# Patient Record
Sex: FEMALE | Race: BLACK OR AFRICAN AMERICAN | NOT HISPANIC OR LATINO | Employment: FULL TIME | URBAN - METROPOLITAN AREA
[De-identification: names, ages, dates, MRNs, and addresses within clinical notes are randomized per-mention and may not be internally consistent; named-entity substitution may affect disease eponyms.]

---

## 2018-11-16 ENCOUNTER — HOSPITAL ENCOUNTER (INPATIENT)
Facility: CLINIC | Age: 38
Setting detail: SURGERY ADMIT
End: 2018-11-16
Attending: SURGERY | Admitting: SURGERY
Payer: COMMERCIAL

## 2018-11-16 ENCOUNTER — HOSPITAL ENCOUNTER (OUTPATIENT)
Facility: CLINIC | Age: 38
Setting detail: OBSERVATION
Discharge: HOME | End: 2018-11-18
Attending: EMERGENCY MEDICINE | Admitting: SURGERY
Payer: COMMERCIAL

## 2018-11-16 ENCOUNTER — APPOINTMENT (OUTPATIENT)
Dept: ULTRASOUND IMAGING | Facility: CLINIC | Age: 38
End: 2018-11-16
Attending: EMERGENCY MEDICINE
Payer: COMMERCIAL

## 2018-11-16 DIAGNOSIS — K82.1 GALLBLADDER HYDROPS: Primary | ICD-10-CM

## 2018-11-16 DIAGNOSIS — K81.0 ACUTE CHOLECYSTITIS: ICD-10-CM

## 2018-11-16 LAB
ALBUMIN SERPL-MCNC: 3.7 G/DL (ref 3.4–5)
ALBUMIN UR-MCNC: NEGATIVE MG/DL
ALP SERPL-CCNC: 29 U/L (ref 40–150)
ALT SERPL W P-5'-P-CCNC: 27 U/L (ref 0–50)
ANION GAP SERPL CALCULATED.3IONS-SCNC: 9 MMOL/L (ref 3–14)
APPEARANCE UR: CLEAR
AST SERPL W P-5'-P-CCNC: 36 U/L (ref 0–45)
BASOPHILS # BLD AUTO: 0 10E9/L (ref 0–0.2)
BASOPHILS NFR BLD AUTO: 0.2 %
BILIRUB SERPL-MCNC: 0.5 MG/DL (ref 0.2–1.3)
BILIRUB UR QL STRIP: NEGATIVE
BUN SERPL-MCNC: 5 MG/DL (ref 7–30)
CALCIUM SERPL-MCNC: 9 MG/DL (ref 8.5–10.1)
CHLORIDE SERPL-SCNC: 105 MMOL/L (ref 94–109)
CO2 SERPL-SCNC: 23 MMOL/L (ref 20–32)
COLOR UR AUTO: ABNORMAL
CREAT SERPL-MCNC: 0.51 MG/DL (ref 0.52–1.04)
DIFFERENTIAL METHOD BLD: NORMAL
EOSINOPHIL # BLD AUTO: 0.2 10E9/L (ref 0–0.7)
EOSINOPHIL NFR BLD AUTO: 2.4 %
ERYTHROCYTE [DISTWIDTH] IN BLOOD BY AUTOMATED COUNT: 13.3 % (ref 10–15)
GFR SERPL CREATININE-BSD FRML MDRD: >90 ML/MIN/1.7M2
GLUCOSE SERPL-MCNC: 72 MG/DL (ref 70–99)
GLUCOSE UR STRIP-MCNC: NEGATIVE MG/DL
HCG UR QL: NEGATIVE
HCT VFR BLD AUTO: 42.5 % (ref 35–47)
HGB BLD-MCNC: 13.9 G/DL (ref 11.7–15.7)
HGB UR QL STRIP: NEGATIVE
IMM GRANULOCYTES # BLD: 0 10E9/L (ref 0–0.4)
IMM GRANULOCYTES NFR BLD: 0.2 %
KETONES UR STRIP-MCNC: 10 MG/DL
LACTATE BLD-SCNC: 1.4 MMOL/L (ref 0.7–2)
LEUKOCYTE ESTERASE UR QL STRIP: NEGATIVE
LIPASE SERPL-CCNC: 74 U/L (ref 73–393)
LYMPHOCYTES # BLD AUTO: 2.2 10E9/L (ref 0.8–5.3)
LYMPHOCYTES NFR BLD AUTO: 35.6 %
MCH RBC QN AUTO: 32 PG (ref 26.5–33)
MCHC RBC AUTO-ENTMCNC: 32.7 G/DL (ref 31.5–36.5)
MCV RBC AUTO: 98 FL (ref 78–100)
MONOCYTES # BLD AUTO: 0.5 10E9/L (ref 0–1.3)
MONOCYTES NFR BLD AUTO: 7.3 %
MUCOUS THREADS #/AREA URNS LPF: PRESENT /LPF
NEUTROPHILS # BLD AUTO: 3.4 10E9/L (ref 1.6–8.3)
NEUTROPHILS NFR BLD AUTO: 54.3 %
NITRATE UR QL: NEGATIVE
NRBC # BLD AUTO: 0 10*3/UL
NRBC BLD AUTO-RTO: 0 /100
PH UR STRIP: 5 PH (ref 5–7)
PLATELET # BLD AUTO: 258 10E9/L (ref 150–450)
POTASSIUM SERPL-SCNC: 4.4 MMOL/L (ref 3.4–5.3)
PROT SERPL-MCNC: 8.9 G/DL (ref 6.8–8.8)
RADIOLOGIST FLAGS: ABNORMAL
RBC # BLD AUTO: 4.35 10E12/L (ref 3.8–5.2)
RBC #/AREA URNS AUTO: 68 /HPF (ref 0–2)
SODIUM SERPL-SCNC: 136 MMOL/L (ref 133–144)
SOURCE: ABNORMAL
SP GR UR STRIP: 1.01 (ref 1–1.03)
SQUAMOUS #/AREA URNS AUTO: <1 /HPF (ref 0–1)
UROBILINOGEN UR STRIP-MCNC: NORMAL MG/DL (ref 0–2)
WBC # BLD AUTO: 6.2 10E9/L (ref 4–11)
WBC #/AREA URNS AUTO: 1 /HPF (ref 0–5)

## 2018-11-16 PROCEDURE — 25000128 H RX IP 250 OP 636: Performed by: EMERGENCY MEDICINE

## 2018-11-16 PROCEDURE — 96375 TX/PRO/DX INJ NEW DRUG ADDON: CPT | Performed by: EMERGENCY MEDICINE

## 2018-11-16 PROCEDURE — 12000001 ZZH R&B MED SURG/OB UMMC

## 2018-11-16 PROCEDURE — 80053 COMPREHEN METABOLIC PANEL: CPT | Performed by: EMERGENCY MEDICINE

## 2018-11-16 PROCEDURE — 76705 ECHO EXAM OF ABDOMEN: CPT

## 2018-11-16 PROCEDURE — 83690 ASSAY OF LIPASE: CPT | Performed by: EMERGENCY MEDICINE

## 2018-11-16 PROCEDURE — 96374 THER/PROPH/DIAG INJ IV PUSH: CPT | Performed by: EMERGENCY MEDICINE

## 2018-11-16 PROCEDURE — 81001 URINALYSIS AUTO W/SCOPE: CPT | Performed by: EMERGENCY MEDICINE

## 2018-11-16 PROCEDURE — 25000128 H RX IP 250 OP 636: Performed by: STUDENT IN AN ORGANIZED HEALTH CARE EDUCATION/TRAINING PROGRAM

## 2018-11-16 PROCEDURE — 25000132 ZZH RX MED GY IP 250 OP 250 PS 637: Performed by: EMERGENCY MEDICINE

## 2018-11-16 PROCEDURE — 83605 ASSAY OF LACTIC ACID: CPT | Performed by: EMERGENCY MEDICINE

## 2018-11-16 PROCEDURE — 85025 COMPLETE CBC W/AUTO DIFF WBC: CPT | Performed by: EMERGENCY MEDICINE

## 2018-11-16 PROCEDURE — 99285 EMERGENCY DEPT VISIT HI MDM: CPT | Mod: 25 | Performed by: EMERGENCY MEDICINE

## 2018-11-16 PROCEDURE — 81025 URINE PREGNANCY TEST: CPT | Performed by: EMERGENCY MEDICINE

## 2018-11-16 PROCEDURE — 99285 EMERGENCY DEPT VISIT HI MDM: CPT | Mod: Z6 | Performed by: EMERGENCY MEDICINE

## 2018-11-16 PROCEDURE — 25000125 ZZHC RX 250: Performed by: EMERGENCY MEDICINE

## 2018-11-16 RX ORDER — ACETAMINOPHEN 500 MG
500-1000 TABLET ORAL EVERY 6 HOURS PRN
COMMUNITY
End: 2020-09-23

## 2018-11-16 RX ORDER — MULTIVIT-MIN/IRON/FOLIC ACID/K 18-600-40
1000 CAPSULE ORAL DAILY
COMMUNITY

## 2018-11-16 RX ORDER — RIBOFLAVIN (VITAMIN B2) 100 MG
100 TABLET ORAL DAILY
COMMUNITY

## 2018-11-16 RX ORDER — CEFTRIAXONE 1 G/1
1 INJECTION, POWDER, FOR SOLUTION INTRAMUSCULAR; INTRAVENOUS EVERY 24 HOURS
Status: DISCONTINUED | OUTPATIENT
Start: 2018-11-16 | End: 2018-11-17

## 2018-11-16 RX ORDER — OXYCODONE HYDROCHLORIDE 5 MG/1
5-10 TABLET ORAL
Status: DISCONTINUED | OUTPATIENT
Start: 2018-11-16 | End: 2018-11-17

## 2018-11-16 RX ORDER — PROCHLORPERAZINE MALEATE 5 MG
10 TABLET ORAL EVERY 6 HOURS PRN
Status: DISCONTINUED | OUTPATIENT
Start: 2018-11-16 | End: 2018-11-17

## 2018-11-16 RX ORDER — HYDROMORPHONE HCL/0.9% NACL/PF 0.2MG/0.2
0.2 SYRINGE (ML) INTRAVENOUS
Status: DISCONTINUED | OUTPATIENT
Start: 2018-11-16 | End: 2018-11-17

## 2018-11-16 RX ORDER — NALOXONE HYDROCHLORIDE 0.4 MG/ML
.1-.4 INJECTION, SOLUTION INTRAMUSCULAR; INTRAVENOUS; SUBCUTANEOUS
Status: DISCONTINUED | OUTPATIENT
Start: 2018-11-16 | End: 2018-11-17

## 2018-11-16 RX ORDER — HYDROMORPHONE HYDROCHLORIDE 1 MG/ML
0.5 INJECTION, SOLUTION INTRAMUSCULAR; INTRAVENOUS; SUBCUTANEOUS ONCE
Status: COMPLETED | OUTPATIENT
Start: 2018-11-16 | End: 2018-11-16

## 2018-11-16 RX ORDER — PROCHLORPERAZINE 25 MG
25 SUPPOSITORY, RECTAL RECTAL EVERY 12 HOURS PRN
Status: DISCONTINUED | OUTPATIENT
Start: 2018-11-16 | End: 2018-11-17

## 2018-11-16 RX ORDER — SODIUM CHLORIDE, SODIUM LACTATE, POTASSIUM CHLORIDE, CALCIUM CHLORIDE 600; 310; 30; 20 MG/100ML; MG/100ML; MG/100ML; MG/100ML
1000 INJECTION, SOLUTION INTRAVENOUS CONTINUOUS
Status: DISCONTINUED | OUTPATIENT
Start: 2018-11-16 | End: 2018-11-17

## 2018-11-16 RX ORDER — ONDANSETRON 2 MG/ML
4 INJECTION INTRAMUSCULAR; INTRAVENOUS EVERY 6 HOURS PRN
Status: DISCONTINUED | OUTPATIENT
Start: 2018-11-16 | End: 2018-11-17

## 2018-11-16 RX ORDER — LIDOCAINE 40 MG/G
CREAM TOPICAL
Status: DISCONTINUED | OUTPATIENT
Start: 2018-11-16 | End: 2018-11-17

## 2018-11-16 RX ORDER — ONDANSETRON 4 MG/1
4 TABLET, ORALLY DISINTEGRATING ORAL EVERY 6 HOURS PRN
Status: DISCONTINUED | OUTPATIENT
Start: 2018-11-16 | End: 2018-11-17

## 2018-11-16 RX ORDER — ACETAMINOPHEN 325 MG/1
975 TABLET ORAL EVERY 8 HOURS
Status: DISCONTINUED | OUTPATIENT
Start: 2018-11-17 | End: 2018-11-17

## 2018-11-16 RX ADMIN — CEFTRIAXONE 1 G: 1 INJECTION, POWDER, FOR SOLUTION INTRAMUSCULAR; INTRAVENOUS at 18:49

## 2018-11-16 RX ADMIN — ONDANSETRON HYDROCHLORIDE 4 MG: 2 INJECTION, SOLUTION INTRAMUSCULAR; INTRAVENOUS at 18:59

## 2018-11-16 RX ADMIN — LIDOCAINE HYDROCHLORIDE 30 ML: 20 SOLUTION ORAL; TOPICAL at 14:27

## 2018-11-16 RX ADMIN — Medication 0.5 MG: at 18:10

## 2018-11-16 ASSESSMENT — ACTIVITIES OF DAILY LIVING (ADL)
COGNITION: 0 - NO COGNITION ISSUES REPORTED
AMBULATION: 0-->INDEPENDENT
RETIRED_COMMUNICATION: 0-->UNDERSTANDS/COMMUNICATES WITHOUT DIFFICULTY
RETIRED_EATING: 0-->INDEPENDENT
SWALLOWING: 0-->SWALLOWS FOODS/LIQUIDS WITHOUT DIFFICULTY
TRANSFERRING: 0-->INDEPENDENT
TOILETING: 0-->INDEPENDENT
DRESS: 0-->INDEPENDENT
FALL_HISTORY_WITHIN_LAST_SIX_MONTHS: YES
BATHING: 0-->INDEPENDENT

## 2018-11-16 ASSESSMENT — PAIN DESCRIPTION - DESCRIPTORS: DESCRIPTORS: JABBING

## 2018-11-16 NOTE — IP AVS SNAPSHOT
MRN:[id number]                      After Visit Summary   11/16/2018    Ana Leblanc    MRN: [id number]           Thank you!     Thank you for choosing [facility] for your care. Our goal is always to provide you with excellent care. Hearing back from our patients is one way we can continue to improve our services. Please take a few minutes to complete the written survey that you may receive in the mail after you visit with us. Thank you!        Patient Information     Date Of Birth          1980        Designated Caregiver       Most Recent Value    Caregiver    Will someone help with your care after discharge? no      About your hospital stay     You were admitted on:  November 16, 2018 You last received care in the:  [address] [facility]    You were discharged on:  November 18, 2018        Reason for your hospital stay       Laparoscopic Cholesectomy                  Who to Call     For medical emergencies, please call 911.  For non-urgent questions about your medical care, please call your primary care provider or clinic, None  For questions related to your surgery, please call your surgery clinic        Attending Provider     Provider Specialty    Matthew Moreno MD Emergency Medicine    Erica, Shane Hogue MD Surgery       Primary Care Provider Fax #    Physician No Ref-Primary [phone]      After Care Instructions     Activity       Your activity upon discharge: as instructed            Diet       Follow this diet upon discharge: Regular            Discharge Instructions       Discharge Instructions  Activity  - No lifting, pushing, pulling more than 15-20 pounds for 3-4 weeks  - Do not drive until you can press the brake pedal quickly and fully without pain  - Do not operate a motor vehicle while taking narcotic pain medications    Incisions  - You may remove wound dressing 24 hours after surgery  - You may shower and get incisions wet starting 24 hrs after surgery  - Do not  scrub incisions or submerge wounds (e.g. bath, pool, hot tub, etc.) for 2 weeks or until the glue or steri-strips have come off    Drain Care  - You do not have a drain.  Specific care/instructions:  Not Applicable    Medications  - Do not take any additional Tylenol (acetaminophen) while using a narcotic pain medication which includes acetaminophen  - Do not take more than 4,000mg of acetaminophen in any 24 hour period, as this can cause liver damage  - Take stool softeners such as Senna or Miralax while you are using narcotics, but stop if you develop diarrhea  - Wean yourself off of narcotic pain medications    Follow-Up:  - Call your primary care provider to touch base regarding your recent admission.     - Call or return sooner than your regularly scheduled visit if you develop any of the following: fever >101.5, uncontrolled pain, uncontrolled nausea or vomiting, as well as increased redness, swelling, or drainage from your wound.   -  A nurse from the General Surgery Clinic will contact you 24 hours, or next business day, after your discharge from the hospital.  If you have questions or to schedule a follow up appointment please call the General Surgery Clinic at [phone].  Call [phone] and ask to speak with the Surgery resident on call if you are having troubles in the evenings, at night, or on the weekend.  -  If you are receiving home care please inform your home care nurse of our contact number.                  Follow-up Appointments     Adult [facility] Follow-up and recommended labs and tests       Follow up with Dr. Maldonado , at [facility]  to evaluate after surgery. No follow up labs or test are needed.    Appointments on [place] and/or [facility] (with [facility] or [facility] provider or service). Call [phone] if you haven't heard regarding these appointments within 7 days of discharge.                  Additional Information     If you use hormonal birth control (such as the pill, patch, ring or  "implants): You'll need a second form of birth control for 7 days (condoms, a diaphragm or contraceptive foam). While in the hospital, you received a medicine called Bridion. Your normal birth control will not work as well for a week after taking this medicine.          Pending Results     Date and Time Order Name Status Description    2018 0348 EKG 12-lead, tracing only Preliminary             Statement of Approval     Ordered          18 0916  I have reviewed and agree with all the recommendations and orders detailed in this document.  EFFECTIVE NOW     Approved and electronically signed by:  Rolando Downs MD             Admission Information     Date & Time Provider Department Dept. Phone    2018 Shane Maldonado MD [address] [facility] [phone]      Your Vitals Were     Blood Pressure Pulse Temperature Respirations Height Weight    117/63 (BP Location: Right arm) 85 99.1  F (37.3  C) (Oral) 16 1.702 m (5' 7\") 91.9 kg (202 lb 8 oz)    Last Period Pulse Oximetry BMI (Body Mass Index)             10/31/2018 99% 31.72 kg/m2         [employer] Information     [employer] lets you send messages to your doctor, view your test results, renew your prescriptions, schedule appointments and more. To sign up, go to [email or web address] . Click on \"Log in\" on the left side of the screen, which will take you to the Welcome page. Then click on \"Sign up Now\" on the right side of the page.     You will be asked to enter the access code listed below, as well as some personal information. Please follow the directions to create your username and password.     Your access code is: [email or web address]  Expires: 2019 11:09 AM     Your access code will  in 90 days. If you need help or a new code, please call your [facility] clinic or [phone].        Care EveryWhere ID     This is your Care EveryWhere ID. This could be used by other organizations to access your [facility] medical records  [id number]      "   Equal Access to Services     [facility]: Hadii aad ku haddavidoumou [place], wabisida luqadaha, qaybta aydeeveronicalien guevara. So [facility] [phone].    ATENCIÓN: Si habla español, tiene a vilchis disposición servicios gratuitos de asistencia lingüística. Yusuf al [phone].    We comply with applicable federal civil rights laws and [place] laws. We do not discriminate on the basis of race, color, national origin, age, disability, sex, sexual orientation, or gender identity.               Review of your medicines      START taking        Dose / Directions    oxyCODONE IR 5 MG tablet   Commonly known as:  ROXICODONE        Dose:  5-10 mg   Take 1-2 tablets (5-10 mg) by mouth every 6 hours as needed for moderate to severe pain   Quantity:  20 tablet   Refills:  0         CONTINUE these medicines which have NOT CHANGED        Dose / Directions    acetaminophen 500 MG tablet   Commonly known as:  TYLENOL        Dose:  500-1000 mg   Take 500-1,000 mg by mouth every 6 hours as needed   Refills:  0       vitamin C 100 MG tablet   Commonly known as:  ASCORBIC ACID        Dose:  100 mg   Take 100 mg by mouth daily   Refills:  0       Vitamin D (Cholecalciferol) 1000 units Tabs        Dose:  1000 Units   Take 1,000 Units by mouth daily   Refills:  0            Where to get your medicines      Some of these will need a paper prescription and others can be bought over the counter. Ask your nurse if you have questions.     Bring a paper prescription for each of these medications     oxyCODONE IR 5 MG tablet                Protect others around you: Learn how to safely use, store and throw away your medicines at www.disposemymeds.org.        Information about OPIOIDS     PRESCRIPTION OPIOIDS: WHAT YOU NEED TO KNOW   We gave you an opioid (narcotic) pain medicine. It is important to manage your pain, but opioids are not always the best choice. You should first try all the other options your care  team gave you. Take this medicine for as short a time (and as few doses) as possible.    Some activities can increase your pain, such as bandage changes or therapy sessions. It may help to take your pain medicine 30 to 60 minutes before these activities. Reduce your stress by getting enough sleep, working on hobbies you enjoy and practicing relaxation or meditation. Talk to your care team about ways to manage your pain beyond prescription opioids.    These medicines have risks:    DO NOT drive when on new or higher doses of pain medicine. These medicines can affect your alertness and reaction times, and you could be arrested for driving under the influence (DUI). If you need to use opioids long-term, talk to your care team about driving.    DO NOT operate heavy machinery    DO NOT do any other dangerous activities while taking these medicines.    DO NOT drink any alcohol while taking these medicines.     If the opioid prescribed includes acetaminophen, DO NOT take with any other medicines that contain acetaminophen. Read all labels carefully. Look for the word  acetaminophen  or  Tylenol.  Ask your pharmacist if you have questions or are unsure.    You can get addicted to pain medicines, especially if you have a history of addiction (chemical, alcohol or substance dependence). Talk to your care team about ways to reduce this risk.    All opioids tend to cause constipation. Drink plenty of water and eat foods that have a lot of fiber, such as fruits, vegetables, prune juice, apple juice and high-fiber cereal. Take a laxative (Miralax, milk of magnesia, Colace, Senna) if you don t move your bowels at least every other day. Other side effects include upset stomach, sleepiness, dizziness, throwing up, tolerance (needing more of the medicine to have the same effect), physical dependence and slowed breathing.    Store your pills in a secure place, locked if possible. We will not replace any lost or stolen medicine. If you  don t finish your medicine, please throw away (dispose) as directed by your pharmacist. The [place] Pollution [employer] has more information about safe disposal: https://www.pca.[facility].mn.us/living-green/managing-unwanted-medications             Medication List: This is a list of all your medications and when to take them. Check marks below indicate your daily home schedule. Keep this list as a reference.      Medications           Morning Afternoon Evening Bedtime As Needed    acetaminophen 500 MG tablet   Commonly known as:  TYLENOL   Take 500-1,000 mg by mouth every 6 hours as needed   Last time this was given:  975 mg on 11/18/2018  7:49 AM                                oxyCODONE IR 5 MG tablet   Commonly known as:  ROXICODONE   Take 1-2 tablets (5-10 mg) by mouth every 6 hours as needed for moderate to severe pain   Last time this was given:  5 mg on 11/18/2018  8:32 AM                                vitamin C 100 MG tablet   Commonly known as:  ASCORBIC ACID   Take 100 mg by mouth daily                                Vitamin D (Cholecalciferol) 1000 units Tabs   Take 1,000 Units by mouth daily

## 2018-11-16 NOTE — LETTER
[address] [facility]  [address] [address]  [phone]    2018    Re: Ana Leblanc  [address] [place] [address]  [phone] (home)     : 1980    To Whom It May Concern:    Ana Leblanc was hospitalized from 2018 until 2018 due to the need for an unexpected and urgent operation with necessary recovery from surgery.  Please excuse her from her regularly scheduled work duties until she feels she is ready to return, no sooner than 2018 and with limited to light duty - lifting no greater than 10 pounds for at least one month.      Sincerely,    Philip Parsons MD

## 2018-11-16 NOTE — ED TRIAGE NOTES
Pt arrives to triage with complaints of intermittent abdominal pain. Pt reports symptoms started 4 days ago. Pt becomes nauseous before she feels the pain. Denies vomiting or diarrhea. Pt has hx colon cancer, last PET scan was in September and was OK per pt. Pt denies any changes with urination or bowel movements. A&O, VSS in triage.

## 2018-11-16 NOTE — IP AVS SNAPSHOT
[address] [address]    Phone:  [phone]                                       After Visit Summary   11/16/2018    Ana Leblanc    MRN: [id number]           After Visit Summary Signature Page     I have received my discharge instructions, and my questions have been answered. I have discussed any challenges I see with this plan with the nurse or doctor.    ..........................................................................................................................................  Patient/Patient Representative Signature      ..........................................................................................................................................  Patient Representative Print Name and Relationship to Patient    ..................................................               ................................................  Date                                   Time    ..........................................................................................................................................  Reviewed by Signature/Title    ...................................................              ..............................................  Date                                               Time          22EPIC Rev 08/18

## 2018-11-17 ENCOUNTER — ANESTHESIA (OUTPATIENT)
Dept: SURGERY | Facility: CLINIC | Age: 38
End: 2018-11-17
Payer: COMMERCIAL

## 2018-11-17 ENCOUNTER — ANESTHESIA EVENT (OUTPATIENT)
Dept: SURGERY | Facility: CLINIC | Age: 38
End: 2018-11-17
Payer: COMMERCIAL

## 2018-11-17 PROBLEM — K82.1 GALLBLADDER HYDROPS: Status: ACTIVE | Noted: 2018-11-17

## 2018-11-17 LAB
ALBUMIN SERPL-MCNC: 2.9 G/DL (ref 3.4–5)
ALP SERPL-CCNC: 24 U/L (ref 40–150)
ALT SERPL W P-5'-P-CCNC: 19 U/L (ref 0–50)
ANION GAP SERPL CALCULATED.3IONS-SCNC: 8 MMOL/L (ref 3–14)
AST SERPL W P-5'-P-CCNC: 15 U/L (ref 0–45)
BILIRUB SERPL-MCNC: 0.4 MG/DL (ref 0.2–1.3)
BUN SERPL-MCNC: 6 MG/DL (ref 7–30)
CALCIUM SERPL-MCNC: 8.4 MG/DL (ref 8.5–10.1)
CHLORIDE SERPL-SCNC: 107 MMOL/L (ref 94–109)
CO2 SERPL-SCNC: 24 MMOL/L (ref 20–32)
CREAT SERPL-MCNC: 0.56 MG/DL (ref 0.52–1.04)
ERYTHROCYTE [DISTWIDTH] IN BLOOD BY AUTOMATED COUNT: 13.3 % (ref 10–15)
GFR SERPL CREATININE-BSD FRML MDRD: >90 ML/MIN/1.7M2
GLUCOSE BLDC GLUCOMTR-MCNC: 130 MG/DL (ref 70–99)
GLUCOSE BLDC GLUCOMTR-MCNC: 52 MG/DL (ref 70–99)
GLUCOSE BLDC GLUCOMTR-MCNC: 72 MG/DL (ref 70–99)
GLUCOSE SERPL-MCNC: 78 MG/DL (ref 70–99)
HCT VFR BLD AUTO: 36 % (ref 35–47)
HGB BLD-MCNC: 11.6 G/DL (ref 11.7–15.7)
MCH RBC QN AUTO: 31.1 PG (ref 26.5–33)
MCHC RBC AUTO-ENTMCNC: 32.2 G/DL (ref 31.5–36.5)
MCV RBC AUTO: 97 FL (ref 78–100)
PLATELET # BLD AUTO: 219 10E9/L (ref 150–450)
POTASSIUM SERPL-SCNC: 3.8 MMOL/L (ref 3.4–5.3)
PROT SERPL-MCNC: 7 G/DL (ref 6.8–8.8)
RBC # BLD AUTO: 3.73 10E12/L (ref 3.8–5.2)
SODIUM SERPL-SCNC: 138 MMOL/L (ref 133–144)
WBC # BLD AUTO: 5.9 10E9/L (ref 4–11)

## 2018-11-17 PROCEDURE — 25000132 ZZH RX MED GY IP 250 OP 250 PS 637: Performed by: STUDENT IN AN ORGANIZED HEALTH CARE EDUCATION/TRAINING PROGRAM

## 2018-11-17 PROCEDURE — 00000146 ZZHCL STATISTIC GLUCOSE BY METER IP

## 2018-11-17 PROCEDURE — 88304 TISSUE EXAM BY PATHOLOGIST: CPT | Performed by: SURGERY

## 2018-11-17 PROCEDURE — 25000566 ZZH SEVOFLURANE, EA 15 MIN: Performed by: SURGERY

## 2018-11-17 PROCEDURE — 96376 TX/PRO/DX INJ SAME DRUG ADON: CPT | Mod: 59

## 2018-11-17 PROCEDURE — 25000128 H RX IP 250 OP 636: Performed by: SURGERY

## 2018-11-17 PROCEDURE — 36000057 ZZH SURGERY LEVEL 3 1ST 30 MIN - UMMC: Performed by: SURGERY

## 2018-11-17 PROCEDURE — G0378 HOSPITAL OBSERVATION PER HR: HCPCS

## 2018-11-17 PROCEDURE — 25000128 H RX IP 250 OP 636: Performed by: NURSE ANESTHETIST, CERTIFIED REGISTERED

## 2018-11-17 PROCEDURE — 37000009 ZZH ANESTHESIA TECHNICAL FEE, EACH ADDTL 15 MIN: Performed by: SURGERY

## 2018-11-17 PROCEDURE — 36415 COLL VENOUS BLD VENIPUNCTURE: CPT | Performed by: SURGERY

## 2018-11-17 PROCEDURE — 36000059 ZZH SURGERY LEVEL 3 EA 15 ADDTL MIN UMMC: Performed by: SURGERY

## 2018-11-17 PROCEDURE — 25000125 ZZHC RX 250: Performed by: NURSE ANESTHETIST, CERTIFIED REGISTERED

## 2018-11-17 PROCEDURE — 25800025 ZZH RX 258: Performed by: ANESTHESIOLOGY

## 2018-11-17 PROCEDURE — 37000008 ZZH ANESTHESIA TECHNICAL FEE, 1ST 30 MIN: Performed by: SURGERY

## 2018-11-17 PROCEDURE — 25000128 H RX IP 250 OP 636: Performed by: ANESTHESIOLOGY

## 2018-11-17 PROCEDURE — 85027 COMPLETE CBC AUTOMATED: CPT | Performed by: SURGERY

## 2018-11-17 PROCEDURE — 71000014 ZZH RECOVERY PHASE 1 LEVEL 2 FIRST HR: Performed by: SURGERY

## 2018-11-17 PROCEDURE — 27210794 ZZH OR GENERAL SUPPLY STERILE: Performed by: SURGERY

## 2018-11-17 PROCEDURE — 40000169 ZZH STATISTIC PRE-PROCEDURE ASSESSMENT I: Performed by: SURGERY

## 2018-11-17 PROCEDURE — 25000128 H RX IP 250 OP 636: Performed by: STUDENT IN AN ORGANIZED HEALTH CARE EDUCATION/TRAINING PROGRAM

## 2018-11-17 PROCEDURE — 25000132 ZZH RX MED GY IP 250 OP 250 PS 637: Performed by: SURGERY

## 2018-11-17 PROCEDURE — 80053 COMPREHEN METABOLIC PANEL: CPT | Performed by: SURGERY

## 2018-11-17 RX ORDER — DEXAMETHASONE SODIUM PHOSPHATE 4 MG/ML
INJECTION, SOLUTION INTRA-ARTICULAR; INTRALESIONAL; INTRAMUSCULAR; INTRAVENOUS; SOFT TISSUE PRN
Status: DISCONTINUED | OUTPATIENT
Start: 2018-11-17 | End: 2018-11-17

## 2018-11-17 RX ORDER — PROPOFOL 10 MG/ML
INJECTION, EMULSION INTRAVENOUS PRN
Status: DISCONTINUED | OUTPATIENT
Start: 2018-11-17 | End: 2018-11-17

## 2018-11-17 RX ORDER — SODIUM CHLORIDE, SODIUM LACTATE, POTASSIUM CHLORIDE, CALCIUM CHLORIDE 600; 310; 30; 20 MG/100ML; MG/100ML; MG/100ML; MG/100ML
INJECTION, SOLUTION INTRAVENOUS CONTINUOUS
Status: DISCONTINUED | OUTPATIENT
Start: 2018-11-17 | End: 2018-11-17 | Stop reason: HOSPADM

## 2018-11-17 RX ORDER — LIDOCAINE 40 MG/G
CREAM TOPICAL
Status: DISCONTINUED | OUTPATIENT
Start: 2018-11-17 | End: 2018-11-18 | Stop reason: HOSPADM

## 2018-11-17 RX ORDER — ONDANSETRON 4 MG/1
4 TABLET, ORALLY DISINTEGRATING ORAL EVERY 6 HOURS PRN
Status: DISCONTINUED | OUTPATIENT
Start: 2018-11-17 | End: 2018-11-18 | Stop reason: HOSPADM

## 2018-11-17 RX ORDER — OXYCODONE HYDROCHLORIDE 5 MG/1
5-10 TABLET ORAL EVERY 4 HOURS PRN
Status: DISCONTINUED | OUTPATIENT
Start: 2018-11-17 | End: 2018-11-18 | Stop reason: HOSPADM

## 2018-11-17 RX ORDER — LIDOCAINE HYDROCHLORIDE 20 MG/ML
INJECTION, SOLUTION INFILTRATION; PERINEURAL PRN
Status: DISCONTINUED | OUTPATIENT
Start: 2018-11-17 | End: 2018-11-17

## 2018-11-17 RX ORDER — NALOXONE HYDROCHLORIDE 0.4 MG/ML
.1-.4 INJECTION, SOLUTION INTRAMUSCULAR; INTRAVENOUS; SUBCUTANEOUS
Status: DISCONTINUED | OUTPATIENT
Start: 2018-11-17 | End: 2018-11-18 | Stop reason: HOSPADM

## 2018-11-17 RX ORDER — ONDANSETRON 2 MG/ML
4 INJECTION INTRAMUSCULAR; INTRAVENOUS EVERY 6 HOURS PRN
Status: DISCONTINUED | OUTPATIENT
Start: 2018-11-17 | End: 2018-11-18 | Stop reason: HOSPADM

## 2018-11-17 RX ORDER — SODIUM CHLORIDE, SODIUM LACTATE, POTASSIUM CHLORIDE, CALCIUM CHLORIDE 600; 310; 30; 20 MG/100ML; MG/100ML; MG/100ML; MG/100ML
INJECTION, SOLUTION INTRAVENOUS CONTINUOUS PRN
Status: DISCONTINUED | OUTPATIENT
Start: 2018-11-17 | End: 2018-11-17

## 2018-11-17 RX ORDER — CEFAZOLIN SODIUM 1 G/3ML
INJECTION, POWDER, FOR SOLUTION INTRAMUSCULAR; INTRAVENOUS PRN
Status: DISCONTINUED | OUTPATIENT
Start: 2018-11-17 | End: 2018-11-17

## 2018-11-17 RX ORDER — PROCHLORPERAZINE MALEATE 5 MG
10 TABLET ORAL EVERY 6 HOURS PRN
Status: DISCONTINUED | OUTPATIENT
Start: 2018-11-17 | End: 2018-11-18 | Stop reason: HOSPADM

## 2018-11-17 RX ORDER — ONDANSETRON 4 MG/1
4 TABLET, ORALLY DISINTEGRATING ORAL EVERY 30 MIN PRN
Status: DISCONTINUED | OUTPATIENT
Start: 2018-11-17 | End: 2018-11-17 | Stop reason: HOSPADM

## 2018-11-17 RX ORDER — HYDROMORPHONE HCL/0.9% NACL/PF 0.2MG/0.2
0.2 SYRINGE (ML) INTRAVENOUS
Status: DISCONTINUED | OUTPATIENT
Start: 2018-11-17 | End: 2018-11-17

## 2018-11-17 RX ORDER — ONDANSETRON 2 MG/ML
4 INJECTION INTRAMUSCULAR; INTRAVENOUS EVERY 30 MIN PRN
Status: DISCONTINUED | OUTPATIENT
Start: 2018-11-17 | End: 2018-11-17 | Stop reason: HOSPADM

## 2018-11-17 RX ORDER — ESMOLOL HYDROCHLORIDE 10 MG/ML
INJECTION INTRAVENOUS PRN
Status: DISCONTINUED | OUTPATIENT
Start: 2018-11-17 | End: 2018-11-17

## 2018-11-17 RX ORDER — SODIUM CHLORIDE, SODIUM LACTATE, POTASSIUM CHLORIDE, CALCIUM CHLORIDE 600; 310; 30; 20 MG/100ML; MG/100ML; MG/100ML; MG/100ML
INJECTION, SOLUTION INTRAVENOUS CONTINUOUS
Status: DISCONTINUED | OUTPATIENT
Start: 2018-11-17 | End: 2018-11-18 | Stop reason: HOSPADM

## 2018-11-17 RX ORDER — HYDROMORPHONE HCL/0.9% NACL/PF 0.2MG/0.2
0.2 SYRINGE (ML) INTRAVENOUS
Status: DISCONTINUED | OUTPATIENT
Start: 2018-11-17 | End: 2018-11-18

## 2018-11-17 RX ORDER — FENTANYL CITRATE 50 UG/ML
INJECTION, SOLUTION INTRAMUSCULAR; INTRAVENOUS PRN
Status: DISCONTINUED | OUTPATIENT
Start: 2018-11-17 | End: 2018-11-17

## 2018-11-17 RX ORDER — MEPERIDINE HYDROCHLORIDE 50 MG/ML
12.5 INJECTION INTRAMUSCULAR; INTRAVENOUS; SUBCUTANEOUS
Status: DISCONTINUED | OUTPATIENT
Start: 2018-11-17 | End: 2018-11-17 | Stop reason: HOSPADM

## 2018-11-17 RX ORDER — FENTANYL CITRATE 50 UG/ML
25-50 INJECTION, SOLUTION INTRAMUSCULAR; INTRAVENOUS
Status: DISCONTINUED | OUTPATIENT
Start: 2018-11-17 | End: 2018-11-17 | Stop reason: HOSPADM

## 2018-11-17 RX ORDER — NALOXONE HYDROCHLORIDE 0.4 MG/ML
.1-.4 INJECTION, SOLUTION INTRAMUSCULAR; INTRAVENOUS; SUBCUTANEOUS
Status: DISCONTINUED | OUTPATIENT
Start: 2018-11-17 | End: 2018-11-17 | Stop reason: HOSPADM

## 2018-11-17 RX ORDER — HYDROMORPHONE HYDROCHLORIDE 1 MG/ML
.3-.5 INJECTION, SOLUTION INTRAMUSCULAR; INTRAVENOUS; SUBCUTANEOUS EVERY 10 MIN PRN
Status: DISCONTINUED | OUTPATIENT
Start: 2018-11-17 | End: 2018-11-17 | Stop reason: HOSPADM

## 2018-11-17 RX ORDER — DEXTROSE MONOHYDRATE 25 G/50ML
25 INJECTION, SOLUTION INTRAVENOUS ONCE
Status: COMPLETED | OUTPATIENT
Start: 2018-11-17 | End: 2018-11-17

## 2018-11-17 RX ORDER — ONDANSETRON 2 MG/ML
INJECTION INTRAMUSCULAR; INTRAVENOUS PRN
Status: DISCONTINUED | OUTPATIENT
Start: 2018-11-17 | End: 2018-11-17

## 2018-11-17 RX ORDER — ACETAMINOPHEN 325 MG/1
975 TABLET ORAL EVERY 8 HOURS
Status: DISCONTINUED | OUTPATIENT
Start: 2018-11-17 | End: 2018-11-18 | Stop reason: HOSPADM

## 2018-11-17 RX ORDER — BUPIVACAINE HYDROCHLORIDE 2.5 MG/ML
INJECTION, SOLUTION INFILTRATION; PERINEURAL PRN
Status: DISCONTINUED | OUTPATIENT
Start: 2018-11-17 | End: 2018-11-17 | Stop reason: HOSPADM

## 2018-11-17 RX ADMIN — FENTANYL CITRATE 25 MCG: 50 INJECTION, SOLUTION INTRAMUSCULAR; INTRAVENOUS at 13:53

## 2018-11-17 RX ADMIN — Medication 0.5 MG: at 14:01

## 2018-11-17 RX ADMIN — OXYCODONE HYDROCHLORIDE 10 MG: 5 TABLET ORAL at 19:17

## 2018-11-17 RX ADMIN — BENZOCAINE AND MENTHOL 1 LOZENGE: 15; 3.6 LOZENGE ORAL at 20:16

## 2018-11-17 RX ADMIN — FENTANYL CITRATE 50 MCG: 50 INJECTION, SOLUTION INTRAMUSCULAR; INTRAVENOUS at 13:03

## 2018-11-17 RX ADMIN — SODIUM CHLORIDE, POTASSIUM CHLORIDE, SODIUM LACTATE AND CALCIUM CHLORIDE: 600; 310; 30; 20 INJECTION, SOLUTION INTRAVENOUS at 13:53

## 2018-11-17 RX ADMIN — ACETAMINOPHEN 975 MG: 325 TABLET, FILM COATED ORAL at 07:51

## 2018-11-17 RX ADMIN — BENZOCAINE AND MENTHOL 1 LOZENGE: 15; 3.6 LOZENGE ORAL at 21:12

## 2018-11-17 RX ADMIN — LIDOCAINE HYDROCHLORIDE 80 MG: 20 INJECTION, SOLUTION INFILTRATION; PERINEURAL at 11:05

## 2018-11-17 RX ADMIN — FENTANYL CITRATE 100 MCG: 50 INJECTION, SOLUTION INTRAMUSCULAR; INTRAVENOUS at 11:30

## 2018-11-17 RX ADMIN — PHENYLEPHRINE HYDROCHLORIDE 100 MCG: 10 INJECTION, SOLUTION INTRAMUSCULAR; INTRAVENOUS; SUBCUTANEOUS at 11:19

## 2018-11-17 RX ADMIN — DEXTROSE MONOHYDRATE 25 ML: 500 INJECTION PARENTERAL at 10:02

## 2018-11-17 RX ADMIN — OXYCODONE HYDROCHLORIDE 10 MG: 5 TABLET ORAL at 15:00

## 2018-11-17 RX ADMIN — Medication 0.2 MG: at 21:12

## 2018-11-17 RX ADMIN — SUGAMMADEX 200 MG: 100 INJECTION, SOLUTION INTRAVENOUS at 12:53

## 2018-11-17 RX ADMIN — PHENYLEPHRINE HYDROCHLORIDE 200 MCG: 10 INJECTION, SOLUTION INTRAMUSCULAR; INTRAVENOUS; SUBCUTANEOUS at 11:38

## 2018-11-17 RX ADMIN — SODIUM CHLORIDE, POTASSIUM CHLORIDE, SODIUM LACTATE AND CALCIUM CHLORIDE: 600; 310; 30; 20 INJECTION, SOLUTION INTRAVENOUS at 12:54

## 2018-11-17 RX ADMIN — CEFAZOLIN 2 G: 1 INJECTION, POWDER, FOR SOLUTION INTRAMUSCULAR; INTRAVENOUS at 11:20

## 2018-11-17 RX ADMIN — ROCURONIUM BROMIDE 80 MG: 10 INJECTION INTRAVENOUS at 11:06

## 2018-11-17 RX ADMIN — ACETAMINOPHEN 975 MG: 325 TABLET, FILM COATED ORAL at 00:03

## 2018-11-17 RX ADMIN — BENZOCAINE AND MENTHOL 1 LOZENGE: 15; 3.6 LOZENGE ORAL at 22:16

## 2018-11-17 RX ADMIN — OXYCODONE HYDROCHLORIDE 10 MG: 5 TABLET ORAL at 23:43

## 2018-11-17 RX ADMIN — METRONIDAZOLE 500 MG: 500 INJECTION, SOLUTION INTRAVENOUS at 00:05

## 2018-11-17 RX ADMIN — DEXAMETHASONE SODIUM PHOSPHATE 6 MG: 4 INJECTION, SOLUTION INTRA-ARTICULAR; INTRALESIONAL; INTRAMUSCULAR; INTRAVENOUS; SOFT TISSUE at 11:19

## 2018-11-17 RX ADMIN — SODIUM CHLORIDE, POTASSIUM CHLORIDE, SODIUM LACTATE AND CALCIUM CHLORIDE 1000 ML: 600; 310; 30; 20 INJECTION, SOLUTION INTRAVENOUS at 00:05

## 2018-11-17 RX ADMIN — ACETAMINOPHEN 975 MG: 325 TABLET, FILM COATED ORAL at 22:16

## 2018-11-17 RX ADMIN — METRONIDAZOLE 500 MG: 500 INJECTION, SOLUTION INTRAVENOUS at 06:22

## 2018-11-17 RX ADMIN — ACETAMINOPHEN 975 MG: 325 TABLET, FILM COATED ORAL at 15:00

## 2018-11-17 RX ADMIN — FENTANYL CITRATE 100 MCG: 50 INJECTION, SOLUTION INTRAMUSCULAR; INTRAVENOUS at 12:29

## 2018-11-17 RX ADMIN — SODIUM CHLORIDE, POTASSIUM CHLORIDE, SODIUM LACTATE AND CALCIUM CHLORIDE: 600; 310; 30; 20 INJECTION, SOLUTION INTRAVENOUS at 10:56

## 2018-11-17 RX ADMIN — MIDAZOLAM 2 MG: 1 INJECTION INTRAMUSCULAR; INTRAVENOUS at 10:56

## 2018-11-17 RX ADMIN — PROPOFOL 60 MG: 10 INJECTION, EMULSION INTRAVENOUS at 12:29

## 2018-11-17 RX ADMIN — ESMOLOL HYDROCHLORIDE 100 MG: 10 INJECTION, SOLUTION INTRAVENOUS at 11:09

## 2018-11-17 RX ADMIN — ONDANSETRON 4 MG: 2 INJECTION INTRAMUSCULAR; INTRAVENOUS at 12:26

## 2018-11-17 RX ADMIN — FENTANYL CITRATE 25 MCG: 50 INJECTION, SOLUTION INTRAMUSCULAR; INTRAVENOUS at 13:57

## 2018-11-17 RX ADMIN — HYDROMORPHONE HYDROCHLORIDE 0.5 MG: 1 INJECTION, SOLUTION INTRAMUSCULAR; INTRAVENOUS; SUBCUTANEOUS at 12:54

## 2018-11-17 RX ADMIN — PROPOFOL 200 MG: 10 INJECTION, EMULSION INTRAVENOUS at 11:05

## 2018-11-17 RX ADMIN — FENTANYL CITRATE 50 MCG: 50 INJECTION, SOLUTION INTRAMUSCULAR; INTRAVENOUS at 13:41

## 2018-11-17 RX ADMIN — FENTANYL CITRATE 50 MCG: 50 INJECTION, SOLUTION INTRAMUSCULAR; INTRAVENOUS at 11:46

## 2018-11-17 ASSESSMENT — ACTIVITIES OF DAILY LIVING (ADL)
ADLS_ACUITY_SCORE: 9
ADLS_ACUITY_SCORE: 9
ADLS_ACUITY_SCORE: 10

## 2018-11-17 ASSESSMENT — PAIN DESCRIPTION - DESCRIPTORS
DESCRIPTORS: DISCOMFORT;SORE
DESCRIPTORS: DISCOMFORT;SORE

## 2018-11-17 NOTE — DISCHARGE SUMMARY
SURGERY DISCHARGE SUMMARY  Patient Name: Ana Leblanc  MR#: [id number]  Date of Admission: 11/16/2018  1:06 PM  Date of Discharge: 11/17/2018  Operating Physician: Shane Maldonado MD  Discharging Physician: Shane Maldonado MD    Discharge Diagnoses:  1. Gallbladder hydrops    2. 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  The pain is constant, co-occurring with chills (no fevers) and nausea, and has been present for four days.  She has been experiencing similar episodes of pain intermittently for years (1x/year for past 8 years or so), and believed it was related to her previous cancer and surgery.  During prior episodes, the pain was always self-resolving.  This is the worst it has ever been for her, but she denies CP, SOB, dysuria, headache, or changes in vision.  Before admission she was passing flatus and had experienced 2-3 BMs during the previous day, which is normal for her.  Previous admissions to OSH have noted cholelithiasis on imaging.       Hospital Course:   Ana Leblanc was admitted to the surgery service on 11/16/2018, underwent a laparoscopic cholecystectomy on 11/17/2018 with no events over the proceeding night, and having begun antibiotic treatment with ceftriaxone and metronidazole.  The procedure went well with no complications, and afterward she was transferred to the PACU for post-operative recovery for the mentioned procedure which the patient tolerated well without complications. The patient was transferred to the general floor for postoperative recovery. Cardiopulmonary and renal status remained stable  "throughout the admission. Diet was advanced and patient achieved full return of bowel function. On day of discharge, she was tolerating a regular diet, ambulating, voiding spontaneously without difficulty, and pain was controlled with oral pain medications. The patient was discharged home in stable and improved condition. She will follow up in clinic in as needed.    Pathology:  None    Discharge Exam:  /61 (BP Location: Right arm)  Pulse 85  Temp 97.7  F (36.5  C) (Oral)  Resp 16  Ht 1.702 m (5' 7\")  Wt 91.9 kg (202 lb 8 oz)  LMP 10/31/2018  SpO2 98%  BMI 31.72 kg/m2  General: Alert, in no acute distress.   HEENT: Normocephalic, atraumatic.   Respiratory: Breathing comfortably on RA  Cardiovascular: Non cyanotic.   Gastrointestinal: Soft, nondistended, appropriately tender.   Incisions: c/d/i (four)  Extremities: no edema, wwp      Medications on Discharge:      Review of your medicines      START taking       Dose / Directions    oxyCODONE IR 5 MG tablet   Commonly known as:  ROXICODONE        Dose:  5-10 mg   Take 1-2 tablets (5-10 mg) by mouth every 6 hours as needed for moderate to severe pain   Quantity:  20 tablet   Refills:  0         CONTINUE these medicines which have NOT CHANGED       Dose / Directions    acetaminophen 500 MG tablet   Commonly known as:  TYLENOL        Dose:  500-1000 mg   Take 500-1,000 mg by mouth every 6 hours as needed   Refills:  0       vitamin C 100 MG tablet   Commonly known as:  ASCORBIC ACID        Dose:  100 mg   Take 100 mg by mouth daily   Refills:  0       Vitamin D (Cholecalciferol) 1000 units Tabs        Dose:  1000 Units   Take 1,000 Units by mouth daily   Refills:  0            Where to get your medicines      Some of these will need a paper prescription and others can be bought over the counter. Ask your nurse if you have questions.     Bring a paper prescription for each of these medications      oxyCODONE IR 5 MG tablet             Discharge Procedure " Orders  Reason for your hospital stay   Order Comments: Laparoscopic Cholesectomy     Activity   Order Comments: Your activity upon discharge: as instructed   Order Specific Question Answer Comments   Is discharge order? Yes      Discharge Instructions   Order Comments: Discharge Instructions  Activity  - No lifting, pushing, pulling more than 15-20 pounds for 3-4 weeks  - Do not drive until you can press the brake pedal quickly and fully without pain  - Do not operate a motor vehicle while taking narcotic pain medications    Incisions  - You may remove wound dressing 24 hours after surgery  - You may shower and get incisions wet starting 24 hrs after surgery  - Do not scrub incisions or submerge wounds (e.g. bath, pool, hot tub, etc.) for 2 weeks or until the glue or steri-strips have come off    Drain Care  - You do not have a drain.  Specific care/instructions:  Not Applicable    Medications  - Do not take any additional Tylenol (acetaminophen) while using a narcotic pain medication which includes acetaminophen  - Do not take more than 4,000mg of acetaminophen in any 24 hour period, as this can cause liver damage  - Take stool softeners such as Senna or Miralax while you are using narcotics, but stop if you develop diarrhea  - Wean yourself off of narcotic pain medications    Follow-Up:  - Call your primary care provider to touch base regarding your recent admission.     - Call or return sooner than your regularly scheduled visit if you develop any of the following: fever >101.5, uncontrolled pain, uncontrolled nausea or vomiting, as well as increased redness, swelling, or drainage from your wound.   -  A nurse from the General Surgery Clinic will contact you 24 hours, or next business day, after your discharge from the hospital.  If you have questions or to schedule a follow up appointment please call the General Surgery Clinic at [phone].  Call [phone] and ask to speak with the Surgery resident on  call if you are having troubles in the evenings, at night, or on the weekend.  -  If you are receiving home care please inform your home care nurse of our contact number.     Adult [facility] Follow-up and recommended labs and tests   Order Comments: Follow up with Dr. Maldonado , at [facility]  to evaluate after surgery. No follow up labs or test are needed.    Appointments on [place] and/or [facility] (with [facility] or [facility] provider or service). Call [phone] if you haven't heard regarding these appointments within 7 days of discharge.     Full Code   Order Specific Question Answer Comments   Code status determined by: Discussion with patient/legal decision maker      Diet   Order Comments: Follow this diet upon discharge: Regular   Order Specific Question Answer Comments   Is discharge order? Yes         Rolando Downs MD

## 2018-11-17 NOTE — PLAN OF CARE
Problem: Patient Care Overview  Goal: Plan of Care/Patient Progress Review  Outcome: No Change  Patient arrived from PACU at appx 2:30pm. VSS. Patient complains of abdominal discomfort but rests easily, last dilaudid at 2pm. Abdominal lap sites clean and dry. Continue post op cares. Patient now an observation status.  Observation Goals: Not Met  Pain control,   Tolerating oral diet

## 2018-11-17 NOTE — ED NOTES
[facility], [place]   ED Nurse to Floor Handoff     Ana Leblanc is a 37 year old female who speaks English and lives with family members,  in a home  They arrived in the ED by car from home    ED Chief Complaint: Abdominal Pain and Nausea    ED Dx;   Final diagnoses:   Acute cholecystitis         Needed?: No    Allergies:   Allergies   Allergen Reactions     Chicken Allergy Hives   .  Past Medical Hx:   Past Medical History:   Diagnosis Date     Malignant neoplasm (H)     colon      Baseline Mental status: WDL  Current Mental Status changes: at basesline    Infection present or suspected this encounter: no  Sepsis suspected: No  Isolation type: No active isolations     Activity level - Baseline/Home:  Independent  Activity Level - Current:   Independent    Bariatric equipment needed?: No    In the ED these meds were given:   Medications   ondansetron (ZOFRAN-ODT) ODT tab 4 mg ( Oral See Alternative 11/16/18 1859)     Or   ondansetron (ZOFRAN) injection 4 mg (4 mg Intravenous Given 11/16/18 1859)   cefTRIAXone (ROCEPHIN) 1 g vial to attach to  mL bag for ADULTS or NS 50 mL bag for PEDS (1 g Intravenous New Bag 11/16/18 1849)   lidocaine (viscous) (XYLOCAINE) 2 % 15 mL, alum & mag hydroxide-simethicone (MYLANTA ES/MAALOX  ES) 15 mL GI Cocktail (30 mLs Oral Given 11/16/18 1427)   HYDROmorphone (PF) (DILAUDID) injection 0.5 mg (0.5 mg Intravenous Given 11/16/18 1810)       Drips running?  Yes, IV abx infusing, will start LR    Home pump  No    Current LDAs  Peripheral IV 11/16/18 Right Lower forearm (Active)   Number of days:0       Labs results:   Labs Ordered and Resulted from Time of ED Arrival Up to the Time of Departure from the ED   ROUTINE UA WITH MICROSCOPIC - Abnormal; Notable for the following:        Result Value    Ketones Urine 10 (*)     RBC Urine 68 (*)     Mucous Urine Present (*)     All other components within normal limits   COMPREHENSIVE METABOLIC  PANEL - Abnormal; Notable for the following:     Urea Nitrogen 5 (*)     Creatinine 0.51 (*)     Protein Total 8.9 (*)     Alkaline Phosphatase 29 (*)     All other components within normal limits   HCG QUALITATIVE URINE   CBC WITH PLATELETS DIFFERENTIAL   LIPASE   LACTIC ACID WHOLE BLOOD       Imaging Studies:   Recent Results (from the past 24 hour(s))   Abdomen US, limited (RUQ only)   Result Value    Radiologist flags Concern for acute cholecystitis in the appropriate (Urgent)    Narrative    EXAMINATION: Limited Abdominal Ultrasound, 11/16/2018 3:56 PM     COMPARISON: None.    HISTORY: Right upper quadrant and epigastric pain    FINDINGS:   Fluid: No evidence of ascites or pleural effusions.    Liver: The liver demonstrates mild diffuse increased echogenicity,  measuring 18.2 cm in craniocaudal dimension. There is no focal mass.    Gallbladder: The gallbladder wall is borderline thickened measuring 3  mm. There is minimal pericholecystic fluid. There are numerous  shadowing, mobile stones within the gallbladder lumen. Sonographic  Montiel sign was not elicited during the exam. Mural hyperemia is not  present.    Bile Ducts: Both the intra- and extrahepatic biliary system are of  normal caliber.  The common bile duct measures 5.4 mm in diameter.    Pancreas: Visualized portions of the head and body of the pancreas are  unremarkable.     Kidney: The right kidney measures 10.9 cm long. There is no  hydronephrosis or hydroureter, no shadowing renal calculi, cystic  lesion or mass.       Impression    IMPRESSION:   1.  Cholelithiasis with borderline thickened gallbladder wall an  minimal pericholecystic fluid. Cannot exclude cholecystitis in the  appropriate clinical setting. If clinical concern persists, consider  further evaluation with HIDA scan Nuclear medicine study.  2.  Hepatomegaly. Mild diffuse increased echogenicity throughout the  liver parenchyma which could be seen in hepatic intrinsic disease such  as  "hepatic steatosis.      [Urgent Result: Concern for acute cholecystitis in the appropriate  clinical setting.]    Finding was identified on 11/16/2018 3:56 PM.     Dr. Moreno was contacted by Dr. Xie at 11/16/2018 4:13 PM and  verbalized understanding of the urgent finding.     I have personally reviewed the examination and initial interpretation  and I agree with the findings.    CHRIS MELGAR MD       Recent vital signs:   /88  Pulse 74  Temp 97.5  F (36.4  C) (Oral)  Resp 18  Ht 1.702 m (5' 7\")  Wt 93.4 kg (205 lb 14.4 oz)  SpO2 98%  BMI 32.25 kg/m2    Cardiac Rhythm: Normal Sinus  Pt needs tele? No  Skin/wound Issues: have not completed full skin assessment    Code Status: Full Code    Pain control: fair, controlled with IV dilaudid x1    Nausea control: fair, controlled with IV zofran x1    Abnormal labs/tests/findings requiring intervention:    Family present during ED course? Yes   Family Comments/Social Situation comments: friend at bedside    Tasks needing completion: None    Jodi Cool, RN    [phone] [facility]    "

## 2018-11-17 NOTE — PROGRESS NOTES
"POST OP CHECK  11/17/2018    Ana Leblanc is a 37 year old female with h/o acute cholecystitis now POD #0 s/p laparoscopic cholecystectomy.    Pt reports pain is fairly well controlled. She feels weak and hungry. No nausea or vomiting. No new issues.    /74  Pulse 81  Temp 97.1  F (36.2  C) (Oral)  Resp 13  Ht 1.702 m (5' 7\")  Wt 91.9 kg (202 lb 8 oz)  LMP 10/31/2018  SpO2 95%  BMI 31.72 kg/m2  General: Alert, interactive, & in NAD, resting in bed  Resp: CTAB, no crackles or wheezes  Cardiac: Regular rate; extremities warm and well perfused  Abdomen: Soft, appropriately tender, non-distended  Incision: c/d/i withouth erythema, warmth, or discharge.   Extremities: No LE edema or obvious joint abnormalities    EBL 10    A/P: No acute post-op issues. Continue plan of care per primary team. Please call with questions.     - CLD, advance as tolerated to regular diet     Bobby Najera MD  General Surgery PGY-1  [phone]      "

## 2018-11-17 NOTE — OP NOTE
[facility], [place]    Operative Note    Pre-operative diagnosis: acute chloecystitis   Post-operative diagnosis Acute cholecystitis   Procedure: Procedure(s):  LAPAROSCOPIC CHOLECYSTECTOMY   Surgeon: Surgeon(s) and Role:     * Shane Maldonado MD - Primary     * Philip Parsons MD - Resident - Assisting   Anesthesia: General    Estimated blood loss: 10 mL   Drains: None   Specimens:   ID Type Source Tests Collected by Time Destination   A :  Tissue Gallbladder and Contents SURGICAL PATHOLOGY EXAM Shane Maldonado MD 11/17/2018 12:24 PM       Findings: Inflamed, indurated and edematous gallbladder.     Complications: None.   Implants: None.         OPERATIVE INDICATIONS:  Aan Leblanc is a 37 year old-year-old female with a history ofepigastric and right upper quadrant abdominal pain.  The pain started four days previously and we were asked to evaluate the abdominal pain. RUQ ultrasound was consistent with cholecystitis.    After understanding the risks and benefits of proceeding with surgery, the patient has an indication for laparoscopic cholecystectomy and consented to undergo surgery.      OPERATIVE DETAILS:  The patient was brought to the operating room and prepared in a routine fashion.  A timeout was performed prior to surgery and documented by the nursing team.    Under the benefits of general anesthesia, a supraumbilical incision was made and the abdomen was entered via cutdown. A 12 mm Margaux port was placed and pneumoperitoneum was established using carbon dioxide gas to a maximum pressure of 15 mmHg.  A total of 4 ports were placed and the 12 mm laparoscope was utilized.    The patient was moved into a position with head up. There were a few adhesions present from her prior surgery. Some of these were taken down to allow to proceed with the cholecystectomy. Next, the gallbladder was identified. It was distended, indurated and edematous. An aspirator needle  was used to decompress the gallbladder which had clear fluid present.     The procedure was more challenging given the size of her liver and the extent of her cholecystitis. Upon retraction of the gallbladder, the infundibulum was identified. After some blunt dissection and use of the hook electrocautery, the cystic duct was identified and then skeletonized.  Next, the cystic artery was identified achieving the critical view of safety. 1 surgical clip was applied on the proximal end. The specimen side was cauterized. The cystic duct was then clipped 3 times with 2 clips staying, 1 clip on specimen side. It was then transected with scissors. This was performed without any complication.   Following this, the gallbladder was then retracted back and electrocautery was used to dissect the gallbladder off the cystic plate. Again, the gallbladder wall was clearly edematous and indurated. Finally, the gallbladder was taken off the gallbladder fossa. Hemostasis was attained. The gallbladder was then placed in an endocatch bag and subsequently removed from the supraumbilical port site.     The 12mm incision was closed using a single 0 Vicryl suture and all skin incisions were closed using 4-0 monocryl suture and dermabond was applied.    Dr. Maldonado was present for all critical components of the operation, and all needle and sponge counts were correct x2 at the end of the procedure.    Philip Parsons, PGY8  [phone]                  \

## 2018-11-17 NOTE — ANESTHESIA CARE TRANSFER NOTE
Patient: Ana Leblanc    Procedure(s):  LAPAROSCOPIC CHOLECYSTECTOMY    Diagnosis: acute chloecystitis  Diagnosis Additional Information: No value filed.    Anesthesia Type:   No value filed.     Note:  Airway :Face Mask  Patient transferred to:PACU  Handoff Report: Identifed the Patient, Identified the Reponsible Provider, Reviewed the pertinent medical history, Discussed the surgical course, Reviewed Intra-OP anesthesia mangement and issues during anesthesia, Set expectations for post-procedure period and Allowed opportunity for questions and acknowledgement of understanding      Vitals: (Last set prior to Anesthesia Care Transfer)    CRNA VITALS  11/17/2018 1231 - 11/17/2018 1307      11/17/2018             Resp Rate (observed): 12                Electronically Signed By: Meera Borden CRNA, APRN SAUNDRA  November 17, 2018  1:07 PM

## 2018-11-17 NOTE — H&P
"General Surgery H&P    HPI: Ana Leblanc is a 38 y/o F w/ a past medical history of colon cancer and subtotal abdominal colectomy in 2010 (no chemo or radiation, follows at [facility]) that has had epigastric abdominal pain for 4 days. Rates pain 10/10. Also endorses nausea and chills w/o vomiting or fevers. Has had this pain transiently in the past but it resolved spontaneously (1x/year for the last 8 years). This time it was worse than ever before. Denies cp, sob, dysuria, headache, changes in vision. Passing flatus, having 2-3 bm's per day which is normal for her.    PMhX: Colon cancer (in remission)  PShX: Subtotal abdominal colectomy (ileorectal anastomosis) in 2010. Denies other abdominal surgeries.  SH: Never smoker, no EtOH  FH: No FH of bleeding or anesthesia rxns  Allergies: Ibuprofen  Code: Full  Meds: Tylenol as needed for menstrual cramps    Physical Exam    /88  Pulse 74  Temp 97.5  F (36.4  C) (Oral)  Resp 18  Ht 1.702 m (5' 7\")  Wt 93.4 kg (205 lb 14.4 oz)  SpO2 92%  BMI 32.25 kg/m2    Gen: Well appearing in nad  Eyes: No scleral icterus  Pulm: NLB on RA  CVS: RRR  Abd: Soft, tender in LUQ and RUQ, not distended. No r/g  Ext: Wwp no edema  Psych: Cooperative  Neuro: CN II-XII intact    Lab Results   Component Value Date    WBC 6.2 11/16/2018    HGB 13.9 11/16/2018    HCT 42.5 11/16/2018     11/16/2018     11/16/2018    POTASSIUM 4.4 11/16/2018    CHLORIDE 105 11/16/2018    CO2 23 11/16/2018    BUN 5 (L) 11/16/2018    CR 0.51 (L) 11/16/2018    GLC 72 11/16/2018    AST 36 11/16/2018    ALT 27 11/16/2018    ALKPHOS 29 (L) 11/16/2018    BILITOTAL 0.5 11/16/2018     RUQ ultrasound shows gallbladder wall thickening, pericholecystic fluid, and cholelithiasis    Assessment: 37 year old female with a past medical history of colon cancer s/p subtotal abdominal colectomy that has RUQ pain and tenderness, cholelithiasis, pericholecystic fluid, and gallbladder wall thickening most likely " acute cholecystitis. Other etiologies could be ulcer, symptomatic cholelithiasis. Choledocho unlikely given normal cbd and lack of liver enzyme elevation.    Plan:  - Admit to general surgery  - Ceftriaxone/flagyl  - Clears tonight then NPO at midnight  - Lap thuan tomorrow AM  - Repeat liver enzymes in AM.    D/w chief, Dr Ramirez    --  Rakesh Mondragon  General Surgery PGY2

## 2018-11-17 NOTE — ANESTHESIA POSTPROCEDURE EVALUATION
Anesthesia POST Procedure Evaluation    Patient: Ana Leblanc   MRN:     [id number] Gender:   female   Age:    37 year old :      1980        Preoperative Diagnosis: acute chloecystitis   Procedure(s):  LAPAROSCOPIC CHOLECYSTECTOMY   Postop Comments: No value filed.       Anesthesia Type:  General    Reportable Event: NO     PAIN: Uncomplicated   Sign Out status: Comfortable, Well controlled pain     PONV: No PONV   Sign Out status:  No Nausea or Vomiting     Neuro/Psych: Uneventful perioperative course   Sign Out Status: Preoperative baseline; Age appropriate mentation     Airway/Resp.: Uneventful perioperative course   Sign Out Status: Non labored breathing, age appropriate RR; Resp. Status within EXPECTED Parameters     CV: Uneventful perioperative course   Sign Out status: Appropriate BP and perfusion indices; Appropriate HR/Rhythm     Disposition:   Sign Out in:  PACU  Disposition:  Phase II; Home  Recovery Course: Uneventful  Follow-Up: Not required           Last Anesthesia Record Vitals:  CRNA VITALS  2018 1231 - 2018 1331      2018             Resp Rate (observed): 12          Last PACU/Preop Vitals:  Vitals:    18 2225 18 0329 18 0735   BP: 115/76 108/60 114/68   Pulse: 80 79 81   Resp:    Temp: 37  C (98.6  F) 36.2  C (97.2  F) 36.8  C (98.3  F)   SpO2: 100% 98% 98%         Electronically Signed By: Corbin Joshua DO, 2018, 1:38 PM

## 2018-11-17 NOTE — PROGRESS NOTES
Pt signed waiver to refuse  services today. MD had her sign consent in ED and she has no further questions. She would like to have  offered in the future but prefers to use her friend whenever possible because she knows her history.

## 2018-11-17 NOTE — PLAN OF CARE
Problem: Patient Care Overview  Goal: Plan of Care/Patient Progress Review  Outcome: No Change  Assumed care of pt around 2200 on 11/16/18 when transferred from ED. VSS. Pt c/o pain managed using PRN and scheduled analgesics- pt has not needed anything since ED stating that it only hurts when she stands and moves around. Pt tolerating NPO since midnight; denies N/V. UOP adequate. + BM/gas. PIV running MIVF. Pt up ad di. Friend in room- very supportive. PLAN: continue POC. Pt needs a note for work from MD.     Pt ok with bedside report if awake.

## 2018-11-17 NOTE — PLAN OF CARE
Problem: Patient Care Overview  Goal: Plan of Care/Patient Progress Review  Outcome: No Change  Patient went to pre-op/OR at appx 10 am this morning. Pre-op shower done. Pain with movement, well managed when still in bed.

## 2018-11-17 NOTE — PHARMACY-ADMISSION MEDICATION HISTORY
Admission medication history interview status for the 11/16/2018 admission is complete. See Epic admission navigator for allergy information, pharmacy, prior to admission medications and immunization status.     Medication history interview sources:  Patient    Changes made to PTA medication list (reason)  Added:   -Acetaminophen 500 mg: Patient takes this medication for menstrual cramps  -Vitamin C: Take 1 tablet by mouth daily  -Vitamin D: Take 1 tablet by mouth daily    Deleted:   -Acetaminophen-codeine: Patient no longer takes this medication  -Fexofenadine: Patient no longer takes this medication  -Ibuprofen: Patient no longer takes this medication    Additional medication history information (including reliability of information, actions taken by pharmacist):  -Patient was able to communicate medications she was taking  -Patient takes Vitamin C & Vitamin D supplements when she remembers, however it has been a few months since she took her last doses  -Patient was unsure of doses of the Vitamin D & Vitamin C supplements      Prior to Admission medications    Medication Sig Last Dose Taking? Auth Provider   acetaminophen (TYLENOL) 500 MG tablet Take 500-1,000 mg by mouth every 6 hours as needed 11/16/2018 at Unknown time Yes Unknown, Entered By History   vitamin C (ASCORBIC ACID) 100 MG tablet Take 100 mg by mouth daily More than a month at Unknown time  Unknown, Entered By History   Vitamin D, Cholecalciferol, 1000 units TABS Take 1,000 Units by mouth daily More than a month at Unknown time  Unknown, Entered By History     Medication history completed by:   Maggie Crouch  Student Pharmacist  [facility]  11/16/2018

## 2018-11-17 NOTE — ANESTHESIA PREPROCEDURE EVALUATION
Anesthesia Pre-Procedure Evaluation    Patient: Ana Leblanc   MRN:     [id number] Gender:   female   Age:    37 year old :      1980        Preoperative Diagnosis: acute chloecystitis   Procedure(s):  LAPAROSCOPIC CHOLECYSTECTOMY     Past Medical History:   Diagnosis Date     Malignant neoplasm (H)     colon      Past Surgical History:   Procedure Laterality Date     ABDOMEN SURGERY      colon removed 7/10          Anesthesia Evaluation     . Pt has had prior anesthetic.            ROS/MED HX    ENT/Pulmonary:  - neg pulmonary ROS     Neurologic:  - neg neurologic ROS     Cardiovascular:  - neg cardiovascular ROS       METS/Exercise Tolerance:     Hematologic:         Musculoskeletal:  - neg musculoskeletal ROS       GI/Hepatic: Comment:  colon cancer and subtotal abdominal colectomy in          Renal/Genitourinary:  - ROS Renal section negative       Endo:  - neg endo ROS       Psychiatric:  - neg psychiatric ROS       Infectious Disease:         Malignancy:         Other:                   Procedure: Procedure(s):  LAPAROSCOPIC CHOLECYSTECTOMY    HPI: Ana Leblanc is a 37 year old female who presents for the above procedure.     PMHx/PSHx:  Past Medical History:   Diagnosis Date     Malignant neoplasm (H)     colon       Past Surgical History:   Procedure Laterality Date     ABDOMEN SURGERY      colon removed 7/10         No current facility-administered medications on file prior to encounter.   No current outpatient prescriptions on file prior to encounter.    Social Hx:   Social History   Substance Use Topics     Smoking status: Never Smoker     Smokeless tobacco: Never Used     Alcohol use No       Allergies:   Allergies   Allergen Reactions     Chicken Allergy Hives     Ibuprofen          NPO Status: Per ASA Guidelines    Labs:    Blood Bank:  No results found for: ABO, RH, AS  BMP:  Recent Labs   Lab Test  18   1416   NA  136   POTASSIUM  4.4   CHLORIDE  105   CO2  23   BUN  5*   CR   "0.51*   GLC  72   LEXII  9.0     CBC:   Recent Labs   Lab Test  11/16/18   1416   WBC  6.2   RBC  4.35   HGB  13.9   HCT  42.5   MCV  98   MCH  32.0   MCHC  32.7   RDW  13.3   PLT  258     Coags:  No results for input(s): INR, PTT, FIBR in the last 85918 hours.            PHYSICAL EXAM:   Mental Status/Neuro: A/A/O   Airway: Facies: Feasible  Mallampati: I  Mouth/Opening: Full  TM distance: > 6 cm  Neck ROM: Full   Respiratory: Auscultation: CTAB     Resp. Rate: Normal     Resp. Effort: Normal      CV: Rhythm: Regular  Rate: Age appropriate  Heart: Normal Sounds   Comments:      Dental: Normal                  Lab Results   Component Value Date    WBC 6.2 11/16/2018    HGB 13.9 11/16/2018    HCT 42.5 11/16/2018     11/16/2018     11/16/2018    POTASSIUM 4.4 11/16/2018    CHLORIDE 105 11/16/2018    CO2 23 11/16/2018    BUN 5 (L) 11/16/2018    CR 0.51 (L) 11/16/2018    GLC 72 11/16/2018    LEXII 9.0 11/16/2018    ALBUMIN 3.7 11/16/2018    PROTTOTAL 8.9 (H) 11/16/2018    ALT 27 11/16/2018    AST 36 11/16/2018    ALKPHOS 29 (L) 11/16/2018    BILITOTAL 0.5 11/16/2018    LIPASE 74 11/16/2018    HCG Negative 11/16/2018       Preop Vitals  BP Readings from Last 3 Encounters:   11/16/18 115/76   08/20/16 111/80   09/09/11 120/76    Pulse Readings from Last 3 Encounters:   11/16/18 80   08/20/16 107   09/09/11 76      Resp Readings from Last 3 Encounters:   11/16/18 18   08/20/16 16   09/09/11 16    SpO2 Readings from Last 3 Encounters:   11/16/18 100%   08/20/16 99%   09/09/11 100%      Temp Readings from Last 1 Encounters:   11/16/18 37  C (98.6  F) (Oral)    Ht Readings from Last 1 Encounters:   11/16/18 1.702 m (5' 7\")      Wt Readings from Last 1 Encounters:   11/16/18 91.9 kg (202 lb 8 oz)    Estimated body mass index is 31.72 kg/(m^2) as calculated from the following:    Height as of this encounter: 1.702 m (5' 7\").    Weight as of this encounter: 91.9 kg (202 lb 8 oz).     LDA:  Peripheral IV 11/16/18 Right " Lower forearm (Active)   Number of days:1            Assessment:   ASA SCORE: 2    NPO Status: > 6 hours since completed Solid [employer]   Documentation: H&P complete; Preop Testing complete; Consents complete   Proceeding: Proceed without further delay  Tobacco Use:  NO Active use of Tobacco/UNKNOWN Tobacco use status     Plan:   Anes. Type:  General   Pre-Induction: Midazolam IV   Induction:  IV (Standard)   Airway: Oral ETT   Access/Monitoring: PIV   Maintenance: Balanced   Emergence: Procedure Site   Logistics: Same Day Surgery     Postop Pain/Sedation Strategy:  Standard-Options: Opioids PRN     PONV Management:  Adult Risk Factors: Female, Non-Smoker, Postop Opioids  Prevention: Ondansetron; Dexamethasone     CONSENT: Direct conversation   Plan and risks discussed with: Patient   Blood Products: Consented (ALL Blood Products)                         Emily Sawyer MD

## 2018-11-18 ENCOUNTER — APPOINTMENT (OUTPATIENT)
Dept: GENERAL RADIOLOGY | Facility: CLINIC | Age: 38
End: 2018-11-18
Payer: COMMERCIAL

## 2018-11-18 VITALS
WEIGHT: 202.5 LBS | DIASTOLIC BLOOD PRESSURE: 54 MMHG | HEART RATE: 85 BPM | TEMPERATURE: 97.2 F | RESPIRATION RATE: 16 BRPM | BODY MASS INDEX: 31.78 KG/M2 | OXYGEN SATURATION: 98 % | SYSTOLIC BLOOD PRESSURE: 111 MMHG | HEIGHT: 67 IN

## 2018-11-18 LAB
ANION GAP SERPL CALCULATED.3IONS-SCNC: 7 MMOL/L (ref 3–14)
BUN SERPL-MCNC: 6 MG/DL (ref 7–30)
CALCIUM SERPL-MCNC: 8.5 MG/DL (ref 8.5–10.1)
CHLORIDE SERPL-SCNC: 107 MMOL/L (ref 94–109)
CO2 SERPL-SCNC: 24 MMOL/L (ref 20–32)
CREAT SERPL-MCNC: 0.57 MG/DL (ref 0.52–1.04)
ERYTHROCYTE [DISTWIDTH] IN BLOOD BY AUTOMATED COUNT: 13.3 % (ref 10–15)
GFR SERPL CREATININE-BSD FRML MDRD: >90 ML/MIN/1.7M2
GLUCOSE SERPL-MCNC: 89 MG/DL (ref 70–99)
HCT VFR BLD AUTO: 34.8 % (ref 35–47)
HGB BLD-MCNC: 11.2 G/DL (ref 11.7–15.7)
MCH RBC QN AUTO: 31.4 PG (ref 26.5–33)
MCHC RBC AUTO-ENTMCNC: 32.2 G/DL (ref 31.5–36.5)
MCV RBC AUTO: 98 FL (ref 78–100)
PLATELET # BLD AUTO: 218 10E9/L (ref 150–450)
POTASSIUM SERPL-SCNC: 3.6 MMOL/L (ref 3.4–5.3)
RBC # BLD AUTO: 3.57 10E12/L (ref 3.8–5.2)
SODIUM SERPL-SCNC: 138 MMOL/L (ref 133–144)
TROPONIN I SERPL-MCNC: <0.015 UG/L (ref 0–0.04)
WBC # BLD AUTO: 7.6 10E9/L (ref 4–11)

## 2018-11-18 PROCEDURE — 93005 ELECTROCARDIOGRAM TRACING: CPT

## 2018-11-18 PROCEDURE — 71045 X-RAY EXAM CHEST 1 VIEW: CPT

## 2018-11-18 PROCEDURE — 93010 ELECTROCARDIOGRAM REPORT: CPT | Performed by: INTERNAL MEDICINE

## 2018-11-18 PROCEDURE — 84484 ASSAY OF TROPONIN QUANT: CPT | Performed by: STUDENT IN AN ORGANIZED HEALTH CARE EDUCATION/TRAINING PROGRAM

## 2018-11-18 PROCEDURE — G0378 HOSPITAL OBSERVATION PER HR: HCPCS

## 2018-11-18 PROCEDURE — 25000128 H RX IP 250 OP 636: Performed by: SURGERY

## 2018-11-18 PROCEDURE — 25000132 ZZH RX MED GY IP 250 OP 250 PS 637: Performed by: SURGERY

## 2018-11-18 PROCEDURE — 96376 TX/PRO/DX INJ SAME DRUG ADON: CPT

## 2018-11-18 PROCEDURE — 25000128 H RX IP 250 OP 636: Performed by: STUDENT IN AN ORGANIZED HEALTH CARE EDUCATION/TRAINING PROGRAM

## 2018-11-18 PROCEDURE — 85027 COMPLETE CBC AUTOMATED: CPT | Performed by: STUDENT IN AN ORGANIZED HEALTH CARE EDUCATION/TRAINING PROGRAM

## 2018-11-18 PROCEDURE — 80048 BASIC METABOLIC PNL TOTAL CA: CPT | Performed by: STUDENT IN AN ORGANIZED HEALTH CARE EDUCATION/TRAINING PROGRAM

## 2018-11-18 PROCEDURE — 36415 COLL VENOUS BLD VENIPUNCTURE: CPT | Performed by: STUDENT IN AN ORGANIZED HEALTH CARE EDUCATION/TRAINING PROGRAM

## 2018-11-18 RX ORDER — OXYCODONE HYDROCHLORIDE 5 MG/1
5-10 TABLET ORAL EVERY 6 HOURS PRN
Qty: 20 TABLET | Refills: 0 | Status: SHIPPED | OUTPATIENT
Start: 2018-11-18 | End: 2020-09-23

## 2018-11-18 RX ADMIN — SODIUM CHLORIDE, POTASSIUM CHLORIDE, SODIUM LACTATE AND CALCIUM CHLORIDE: 600; 310; 30; 20 INJECTION, SOLUTION INTRAVENOUS at 03:36

## 2018-11-18 RX ADMIN — OXYCODONE HYDROCHLORIDE 5 MG: 5 TABLET ORAL at 08:32

## 2018-11-18 RX ADMIN — OXYCODONE HYDROCHLORIDE 10 MG: 5 TABLET ORAL at 04:16

## 2018-11-18 RX ADMIN — ACETAMINOPHEN 975 MG: 325 TABLET, FILM COATED ORAL at 07:49

## 2018-11-18 RX ADMIN — Medication 0.2 MG: at 03:51

## 2018-11-18 RX ADMIN — OXYCODONE HYDROCHLORIDE 10 MG: 5 TABLET ORAL at 12:21

## 2018-11-18 ASSESSMENT — PAIN DESCRIPTION - DESCRIPTORS
DESCRIPTORS: SHARP;PRESSURE
DESCRIPTORS: SHARP;PRESSURE

## 2018-11-18 NOTE — PROVIDER NOTIFICATION
Notified MD at 0305  Spoke with: Dr. Bg Conley  Action: EKG, Troponin, and chest xray. Gave PRN analgesics and continue to monitor.   Comments: Pt had sudden c/o pain to right chest which caused increased RR of 30-45; B/P soft but other VSS.

## 2018-11-18 NOTE — PLAN OF CARE
Observation Goals:    Pain control - goal in progress, pain somewhat managed with tylenol, oxycodone, and heating pad    Tolerating oral diet - goal in progress, pt has only had sips of clear liquids and crackers tonight

## 2018-11-18 NOTE — PLAN OF CARE
Problem: Patient Care Overview  Goal: Plan of Care/Patient Progress Review  Outcome: Adequate for Discharge Date Met: 11/18/18  Patient is feeling better this afternoon. Ambulated with stand by assist in halls. Pain managed with oxycodone. Tolerated a bowl of veggie soup and tea this afternoon. Reviewed discharge orders, patient awaiting ride, has follow up numbers if needed.  Observation Goals Met:  Tolerating diet-Met  Pain Managed-Met

## 2018-11-18 NOTE — PLAN OF CARE
Problem: Patient Care Overview  Goal: Plan of Care/Patient Progress Review  Outcome: No Change  VSS. Pt c/o pain somewhat managed using PRN and scheduled analgesics. Pt tolerating regular diet; denies N/V. UOP adequate. No BM/gas. PIV running MIVF. Pt up with SBA. PLAN: continue POC. Pt needs a new note for work from MD that states she should be off for 2 weeks.      Pt ok with bedside report if awake.     0530- All tests R/T chest pain came back WNL and pain decreased with PRN IV and oral analgesics.

## 2018-11-18 NOTE — PROGRESS NOTES
"General Surgery Progress Note 11/18/2018    S: Team notified of acute onset R chest pain x 2 hours. Felt some of this pain immediately post-op but now worse. Sharp, radiates throughout R chest, worse w/ deep inhalation. Feels short of breath due to pain. Denies abdominal pain. Denies neck or arm pain. Denies left-sided chest pain. No fevers, chills, nausea, vomiting.    O:   /63 (BP Location: Left arm)  Pulse 82  Temp 97  F (36.1  C) (Oral)  Resp 20  Ht 1.702 m (5' 7\")  Wt 91.9 kg (202 lb 8 oz)  LMP 10/31/2018  SpO2 99%  BMI 31.72 kg/m2   Awake, alert, laying in bed, moderate distress due to pain  RRR, no m/r/g  Tachypneic, maintaining sats in upper 90s on RA  Moderate tenderness to palpation of R anterior chest wall, just below R breast - causes wincing  Extremities warm and well-perfused    A/P:  37 y.o. Woman now POD #1 s/p lap thuan, now w/ acute onset R chest pain. Given physical exam findings, likely musculoskeletal chest wall pain given reproducibility. Less likely PE although possible given recent surgery. Less likely ACS or pneumonia but will work these up.    - EKG - normal sinus rhythm, no acute concerning pathology  - Chest x-ray pending  - Troponin pending  - Management w/ pain medications  - Will pursue further workup if persisting or worsening, or with new or concerning findings on CXR    Bg Curry MD  Surgery PGY-1  p: [phone]   "

## 2018-11-18 NOTE — PLAN OF CARE
Problem: Patient Care Overview  Goal: Plan of Care/Patient Progress Review  Outcome: No Change  VSS, weaned of oxygen. Capno WNL. Lap sites CDI. Reg diet, had sips of clears and crackers tonight. IVF infusing into PIV @ 75 mL/hr. Voided x1, adequate UOP. Pain somewhat managed at rest, but quite painful with movement. Cross-cover paged and ordered IV dilaudid for breakthrough pain. Requesting lozenges for c/o sore throat. Continue to monitor and with POC.     Observation Goals:    Pain control - in progress, pain somewhat managed with scheduled tylenol, PRN oxycodone/IV dilaudid, and heating pad    Tolerating oral diet - in progress, pt has only had sips of clear liquids and crackers tonight

## 2018-11-18 NOTE — PLAN OF CARE
Observation Goals:      Pain control - in progress, pain somewhat managed with scheduled tylenol, PRN oxycodone/IV dilaudid, and heating pad      Tolerating oral diet - in progress, pt has only had sips of clear liquids and crackers tonight

## 2018-11-19 ENCOUNTER — PATIENT OUTREACH (OUTPATIENT)
Dept: SURGERY | Facility: CLINIC | Age: 38
End: 2018-11-19

## 2018-11-19 LAB — INTERPRETATION ECG - MUSE: NORMAL

## 2018-11-19 NOTE — PROGRESS NOTES
Ana Leblanc is a patient of Dr. Shane Maldonado that underwent a Laparoscopic cholecystectomy approximately 2 days ago.  Attempted to contact patient via telephone for a status update and review post op teaching.  LM on VM to call office.  Await return call.      Of note:  Pathology:  Pending  Wound:  Dermabond  Follow-up:  Routine  Restrictions:  - No lifting, pushing, pulling more than 15-20 pounds for 3-4 weeks  New medications:  Oxycodone

## 2018-11-23 LAB — COPATH REPORT: NORMAL

## 2018-11-26 NOTE — PROGRESS NOTES
Patient called the General Surgery Triage line over the Thanksgiving Holiday weekend and LM.  Patient has concerns regarding incisional discomfort, especially at HS.    Attempted to reach patient with no answer.  LM to call office- contact information provided.

## 2018-11-26 NOTE — PROGRESS NOTES
RN Post-Op/Post-Discharge Care Coordination Note    Spoke with Patient with the use of a Greek .    Support  Patient able to care for self independently     Health Status  Fevers/chills: Patient denies any fever or chills.  Nausea/Vomiting: Patient denies nausea/vomiting.  Eating/drinking: Patient is able to eat and drink without any complaints.  Bowel habits: patient states she is having pain when having a bowel movement. Discussed using a stool softener prn to help with this. She states she would prefer to drink water and not take any pills.  Drains (LUANNE): N/A  Incisions: Patient denies any signs and symptoms of infection..  Wound closure:  Dermabond   Pain: patient is using Tylenol for pain. She states she has pain after certain activities. Discussed it is normal to have pain for up to 8 weeks after surgery.      Activity/Restrictions  No lifting in excess of 15-20 pounds for 3-4 weeks    Equipment  None    Pathology reviewed with patient:  Yes: reviewed    Forms/Letters  No    All of her questions were answered including reviewing restrictions, pathology, and wound care.  She will call this office if she has any further questions and/or concerns.      In lieu of a post-op clinic patient that patient would like to be contacted in approximately 7 days via telephone.    A copy of this note was routed to the primary surgeon.      Whom and When to Call  Patient acknowledges understanding of how to manage any medication changes and   when to seek medical care.     Patient advised that if after hour medical concerns arise to please call [phone] and choose option 4 to speak to the physician on call.         Patient Name: ANTHONY GRIFFIN   MR#: [id number]   Specimen #: [id number]   Collected: 11/17/2018   Received: 11/17/2018   Reported: 11/23/2018 15:52   Ordering Phy(s): YURI VELAZCO     For improved result formatting, select 'View Enhanced Report Format' under    Linked Documents section.      SPECIMEN(S):   Gallbladder and contents     FINAL DIAGNOSIS:   GALLBLADDER AND CONTENTS, CHOLECYSTECTOMY:   - Acute on chronic cholecystitis with cholelithiasis     I have personally reviewed all specimens and/or slides, including the   listed special stains, and used them   with my medical judgement to determine or confirm the final diagnosis.     Electronically signed out by:     Narcisa Wagner M.D., [facility]

## 2018-12-04 ENCOUNTER — PATIENT OUTREACH (OUTPATIENT)
Dept: SURGERY | Facility: CLINIC | Age: 38
End: 2018-12-04

## 2018-12-04 NOTE — PROGRESS NOTES
Ana Leblanc was contacted several days post procedure via telephone for a status update and elected to have a telephone follow -up call approximately 10 days after original contact in lieu of a clinic visit with Dr. Shane Maldonado.  She continues to do well and the Dermabond has started to peel off.  The patients wounds are healed and the area is C/D/I.  She is afebrile, pain free, and eitan po; the patient is slowly resuming her normal activity. Discussed lifting restrictions and activities. She is happy with her progress.      Pathology was reviewed with the patient: No: already discussed    All of Ana Leblanc question's were answered and  she would like to return to the clinic on a PRN basis.      A copy of this note was routed to his surgeon.

## 2020-09-23 ENCOUNTER — HOSPITAL ENCOUNTER (EMERGENCY)
Facility: CLINIC | Age: 40
Discharge: HOME | End: 2020-09-23
Attending: EMERGENCY MEDICINE | Admitting: EMERGENCY MEDICINE
Payer: COMMERCIAL

## 2020-09-23 ENCOUNTER — APPOINTMENT (OUTPATIENT)
Dept: GENERAL RADIOLOGY | Facility: CLINIC | Age: 40
End: 2020-09-23
Attending: EMERGENCY MEDICINE
Payer: COMMERCIAL

## 2020-09-23 VITALS
OXYGEN SATURATION: 100 % | RESPIRATION RATE: 20 BRPM | BODY MASS INDEX: 32.25 KG/M2 | HEIGHT: 67 IN | WEIGHT: 205.47 LBS | HEART RATE: 75 BPM | DIASTOLIC BLOOD PRESSURE: 90 MMHG | TEMPERATURE: 99 F | SYSTOLIC BLOOD PRESSURE: 127 MMHG

## 2020-09-23 DIAGNOSIS — S46.812A TRAPEZIUS STRAIN, LEFT, INITIAL ENCOUNTER: ICD-10-CM

## 2020-09-23 DIAGNOSIS — S40.012A CONTUSION OF LEFT SHOULDER, INITIAL ENCOUNTER: ICD-10-CM

## 2020-09-23 PROCEDURE — 99283 EMERGENCY DEPT VISIT LOW MDM: CPT | Mod: Z6 | Performed by: EMERGENCY MEDICINE

## 2020-09-23 PROCEDURE — 73030 X-RAY EXAM OF SHOULDER: CPT | Mod: LT

## 2020-09-23 PROCEDURE — 99283 EMERGENCY DEPT VISIT LOW MDM: CPT | Performed by: EMERGENCY MEDICINE

## 2020-09-23 RX ORDER — CYCLOBENZAPRINE HCL 5 MG
5 TABLET ORAL 3 TIMES DAILY PRN
Qty: 30 TABLET | Refills: 0 | Status: SHIPPED | OUTPATIENT
Start: 2020-09-23 | End: 2021-12-23

## 2020-09-23 RX ORDER — ACETAMINOPHEN 325 MG/1
650 TABLET ORAL EVERY 6 HOURS PRN
Qty: 30 TABLET | Refills: 0 | Status: SHIPPED | OUTPATIENT
Start: 2020-09-23

## 2020-09-23 ASSESSMENT — MIFFLIN-ST. JEOR: SCORE: 1644.63

## 2020-09-23 ASSESSMENT — ENCOUNTER SYMPTOMS
NAUSEA: 0
FEVER: 0
DIFFICULTY URINATING: 0
ABDOMINAL PAIN: 0
SHORTNESS OF BREATH: 0
VOMITING: 0
NERVOUS/ANXIOUS: 0
BACK PAIN: 0
ARTHRALGIAS: 1
CHILLS: 0
NECK PAIN: 0
HEADACHES: 0

## 2020-09-23 NOTE — ED TRIAGE NOTES
Pt presents ambulatory to triage from home. Pt states was descending down 3 steps landing on buttocks then hit left shoulder. Pt states has had left shoulder pain since fall. Denies hitting head. Pt denies there injuries.

## 2020-09-24 NOTE — ED PROVIDER NOTES
ED Provider Note  [facility]      History     Chief Complaint   Patient presents with     Shoulder Injury     HPI  Ana Leblanc is a 39 year old female with no pertinent medical history presents emerged part with complaint of shoulder pain.  She states she fell on Saturday of last week.  She slipped on the stairs and fell onto her buttock.  She braced herself with her left elbow and has since had pain rating up into her left shoulder and left clavicular area.  Pain feels like a tension/squeezing.  Gets worse with any motion of the shoulder.  Inhibits her from sleeping at night.    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 No pertinent family history.  Social History   Social History     Tobacco Use     Smoking status: Never Smoker     Smokeless tobacco: Never Used   Substance Use Topics     Alcohol use: No     Drug use: No      Past medical history, past surgical history, medications, allergies, family history, and social history were reviewed with the patient. No additional pertinent items.       Review of Systems   Constitutional: Negative for chills and fever.   Respiratory: Negative for shortness of  "breath.    Cardiovascular: Negative for chest pain.   Gastrointestinal: Negative for abdominal pain, nausea and vomiting.   Genitourinary: Negative for difficulty urinating.   Musculoskeletal: Positive for arthralgias ( L shoulder pain). Negative for back pain and neck pain.   Neurological: Negative for headaches.   Psychiatric/Behavioral: The patient is not nervous/anxious.      A complete review of systems was performed with pertinent positives and negatives noted in the HPI, and all other systems negative.    Physical Exam   BP: (!) 137/90  Pulse: 94  Temp: 99  F (37.2  C)  Resp: 16  Height: 170.2 cm (5' 7\")  Weight: 93.2 kg (205 lb 7.5 oz)  SpO2: 99 %  Physical Exam  Vitals signs and nursing note reviewed.   Constitutional:       General: She is not in acute distress.     Appearance: Normal appearance.   HENT:      Head: Normocephalic.      Nose: Nose normal.   Eyes:      Pupils: Pupils are equal, round, and reactive to light.   Neck:      Musculoskeletal: Normal range of motion.   Cardiovascular:      Rate and Rhythm: Normal rate and regular rhythm.   Pulmonary:      Effort: Pulmonary effort is normal.   Abdominal:      General: There is no distension.   Musculoskeletal: Normal range of motion.         General: No deformity.      Comments: Tenderness diffusely over the glenohumeral joint extending to the proximal shaft of the humerus.  No obvious deformities.  No overlying skin changes.  No tenderness over the cervical spine.  No clavicular tenderness or evidence of AC joint separation.  Negative empty can test.  Negative Neer testing.  Motor strength intact.  No sensory deficits.  Full range of motion.   Skin:     General: Skin is warm.   Neurological:      Mental Status: She is alert and oriented to person, place, and time.   Psychiatric:         Mood and Affect: Mood normal.           ED Course           Results for orders placed or performed during the hospital encounter of 09/23/20   XR Shoulder Left G/E 3 " Views     Status: None (Preliminary result)    Narrative    EXAM: XR SHOULDER LT G/E 3 VW  LOCATION: [facility]  DATE/TIME: 9/23/2020 6:45 PM    INDICATION: Fall onto shoulder.  COMPARISON: None.      Impression    IMPRESSION: No bony or soft tissue abnormality.      Medications - No data to display     Assessments & Plan (with Medical Decision Making)   Patient presents for evaluation of shoulder pain.    Differential diagnosis includes shoulder dislocation, humerus fracture, AC joint separation, trapezius strain, contusion.    On arrival, patient has normal vital signs.  She does not appear to be distressed.  On physical exam, there is mild tenderness he is able to glenohumeral joint without any focal deficits or obvious deformities.  No evidence of ligamentous injury.    X-rays negative for fracture.  Symptoms most likely consistent with strain of the trapezius muscle and underlying shoulder contusion.  Patient be discharged with acetaminophen and cyclobenzaprine (she has allergy to ibuprofen).  Educated on reasons to return to the emergency department.  She will otherwise follow-up with her PCP in 1 week for reassessment.    I have reviewed the nursing notes. I have reviewed the findings, diagnosis, plan and need for follow up with the patient.    New Prescriptions    No medications on file       Final diagnoses:   Trapezius strain, left, initial encounter   Contusion of left shoulder, initial encounter       --  Temo Bose DO  [facility], EMERGENCY DEPARTMENT  9/23/2020     Temo Bose DO  09/23/20 2013

## 2020-09-24 NOTE — DISCHARGE INSTRUCTIONS
Please make an appointment to follow up with [facility] (phone: [phone]) in 7 days for reassessment.    Return to the emergency department with any new or worsening symptoms.

## 2021-12-23 ENCOUNTER — TELEPHONE (OUTPATIENT)
Dept: EMERGENCY MEDICINE | Facility: CLINIC | Age: 41
End: 2021-12-23

## 2021-12-23 ENCOUNTER — HOSPITAL ENCOUNTER (EMERGENCY)
Facility: CLINIC | Age: 41
Discharge: HOME | End: 2021-12-23
Attending: EMERGENCY MEDICINE | Admitting: EMERGENCY MEDICINE
Payer: COMMERCIAL

## 2021-12-23 VITALS
SYSTOLIC BLOOD PRESSURE: 104 MMHG | HEIGHT: 67 IN | RESPIRATION RATE: 18 BRPM | HEART RATE: 86 BPM | BODY MASS INDEX: 31.08 KG/M2 | OXYGEN SATURATION: 96 % | TEMPERATURE: 99.5 F | DIASTOLIC BLOOD PRESSURE: 71 MMHG | WEIGHT: 198 LBS

## 2021-12-23 DIAGNOSIS — Z20.822 SUSPECTED COVID-19 VIRUS INFECTION: ICD-10-CM

## 2021-12-23 LAB
FLUAV RNA SPEC QL NAA+PROBE: NEGATIVE
FLUBV RNA RESP QL NAA+PROBE: NEGATIVE
RSV RNA SPEC NAA+PROBE: NEGATIVE
SARS-COV-2 RNA RESP QL NAA+PROBE: POSITIVE

## 2021-12-23 PROCEDURE — 99284 EMERGENCY DEPT VISIT MOD MDM: CPT | Performed by: EMERGENCY MEDICINE

## 2021-12-23 PROCEDURE — 99283 EMERGENCY DEPT VISIT LOW MDM: CPT | Performed by: EMERGENCY MEDICINE

## 2021-12-23 PROCEDURE — C9803 HOPD COVID-19 SPEC COLLECT: HCPCS | Performed by: EMERGENCY MEDICINE

## 2021-12-23 PROCEDURE — 87637 SARSCOV2&INF A&B&RSV AMP PRB: CPT | Performed by: EMERGENCY MEDICINE

## 2021-12-23 ASSESSMENT — ENCOUNTER SYMPTOMS
HEADACHES: 1
FEVER: 1
COUGH: 1
MYALGIAS: 1

## 2021-12-23 ASSESSMENT — MIFFLIN-ST. JEOR: SCORE: 1605.75

## 2021-12-23 NOTE — LETTER
December 23, 2021      To Whom It May Concern:      Ana Leblanc was seen in our Emergency Department today, 12/23/21.  She is getting COVID-19 testing and must remain home until results negative and symptoms improved, or if test + for at least 10 days.     Sincerely,        Chrissy Lipscomb MD FACEP

## 2021-12-23 NOTE — ED PROVIDER NOTES
[facility] EMERGENCY DEPARTMENT ([facility])  12/23/21 Vertical Triage A   History     Chief Complaint   Patient presents with     Fever     The history is provided by the patient and medical records.     Ana Leblanc is a 40 year old female who presents with fever and myalgias and cough.  She has 2 other children at home who are sick with similar symptoms. Her symptoms began on Tuesday (2 days ago) primarily with headache and body aches and then over the night last night had subjective fever (not a measured elevated temperature because they do not have a thermometer). Her cough is dry, she has a mild headache, and mild body aches. She works in healthcare at [employer] and is COVID-19 vaccinated x2 but has not had a booster shot.  She is not had COVID-19. She denies a history of immune suppression or or asthma. She is currently on no medicines other than Tylenol as needed.      This part of the document was transcribed by Alexandra Joshua, Medical Scribe.      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  Social History   Social History     Tobacco Use     Smoking status: Never Smoker     Smokeless tobacco: Never Used   Substance Use Topics     Alcohol use: No     Drug use: No      Past medical history, past surgical history, medications, allergies, family history, and social  "history were reviewed with the patient. No additional pertinent items.       Review of Systems   Constitutional: Positive for fever (subjective).   Respiratory: Positive for cough.    Musculoskeletal: Positive for myalgias.   Neurological: Positive for headaches.     A complete review of systems was performed with pertinent positives and negatives noted in the HPI, and all other systems negative.    Physical Exam   BP: 104/71  Pulse: 93  Temp: 99.5  F (37.5  C)  Resp: 18  Height: 170.2 cm (5' 7\")  Weight: 89.8 kg (198 lb)  SpO2: 97 %     Physical Exam  Gen:A&Ox3, no acute distress  HEENT:PERRL, no facial tenderness or wounds, head atraumatic, oropharynx clear, mucous membranes moist, TMs clear bilaterally   CV:RRR without murmurs  PULM:Clear to auscultation bilaterally  Abd:soft, nontender, nondistended. Bowel sounds present and normal  : no wounds or infection  UE:No traumatic injuries, skin normal  LE:no traumatic injuries, skin normal, no LE edema  Skin: no rashes or ecchymoses   ED Course      Procedures       Results for orders placed or performed during the hospital encounter of 12/23/21   Symptomatic; Yes; 12/21/2021 Influenza A/B & SARS-CoV2 (COVID-19) Virus PCR Multiplex Nasopharyngeal     Status: Abnormal    Specimen: Nasopharyngeal; Swab   Result Value Ref Range    Influenza A PCR Negative Negative    Influenza B PCR Negative Negative    RSV PCR Negative Negative    SARS CoV2 PCR Positive (A) Negative, Testing sent to reference lab. Results will be returned via unsolicited result    Narrative    Testing was performed using the Xpert Xpress CoV2/Flu/RSV Assay on the Cepheid GeneXpert Instrument. This test should be ordered for the detection of SARS-CoV-2 and influenza viruses in individuals who meet clinical and/or epidemiological criteria. Test performance is unknown in asymptomatic patients. This test is for in vitro diagnostic use under the FDA EUA for laboratories certified under CLIA to perform high " or moderate complexity testing. This test has not been FDA cleared or approved. A negative result does not rule out the presence of PCR inhibitors in the specimen or target RNA in concentration below the limit of detection for the assay. If only one viral target is positive but coinfection with multiple targets is suspected, the sample should be re-tested with another FDA cleared, approved, or authorized test, if coinfection would change clinical management. This test was validated by the [facility]. These laboratories are certified under the Clinical  Laboratory Improvement Amendments of 1988 (CLIA-88) as qualified to perform high complexity laboratory testing.     Medications - No data to display     Assessments & Plan (with Medical Decision Making)   40-year-old female presenting with viral illness concerning for possible COVID-19 despite COVID-19 vaccination x2 (not booster) and no known COVID-19 exposures though high community transmission rate. Has two children also symptomatic on the same day.   She was tested for COVID-19 by PCR, given a pulse ox for home monitoring and instructions on how to use it.   We will contact her via [employer] or phone with results and given home isolation information.    Discharged.     This part of the document was transcribed by Alexandra Joshua, Medical Scribe.        12/24  COVID +. Pt contacted by phone and she confirmed she has received this result and is doing well. Continue GetWell Loop and pulse oximeter monitoring, and supportive care.     I have reviewed the nursing notes. I have reviewed the findings, diagnosis, plan and need for follow up with the patient.    Discharge Medication List as of 12/23/2021 11:29 AM          Final diagnoses:   Suspected COVID-19 virus infection       --  Chrissy Lipscomb MD FACEP  [facility] EMERGENCY DEPARTMENT  12/23/2021     Chrissy Lipscomb MD  12/25/21 [id number]

## 2021-12-23 NOTE — TELEPHONE ENCOUNTER
"-Coronavirus (COVID-19) Notification    Caller Name (Patient, parent, daughter/son, grandparent, etc)  Patient     Reason for call  Notify of Positive Coronavirus (COVID-19) lab results, assess symptoms,  review  [employer] [facility] recommendations    Lab Result    Lab test:  2019-nCoV rRt-PCR or SARS-CoV-2 PCR    Oropharyngeal AND/OR nasopharyngeal swabs is POSITIVE for 2019-nCoV RNA/SARS-COV-2 PCR (COVID-19 virus)    RN Recommendations/Instructions per [facility] Coronavirus COVID-19 recommendations    Brief introduction script  Introduce self then review script:  \"I am calling on behalf of [employer].  We were notified that your Coronavirus test (COVID-19) for was POSITIVE for the virus.  I have some information to relay to you but first I wanted to mention that the [place] Dept of Health will be contacting you shortly [it's possible MD already called Patient] to talk to you more about how you are feeling and other people you have had contact with who might now also have the virus.  Also,  [employer] [facility] is Partnering with the [facility] for Covid-19 research, you may be contacted directly by research staff.\"    Assessment (Inquire about Patient's current symptoms)   Assessment   Current Symptoms at time of phone call: (if no symptoms, document No symptoms] Cough and myalgias   Symptoms onset (if applicable) 2 days ago     If at time of call, Patients symptoms hare worsened, the Patient should contact 911 or have someone drive them to Emergency Dept promptly:      If Patient calling 911, inform 911 personal that you have tested positive for the Coronavirus (COVID-19).  Place mask on and await 911 to arrive.    If Emergency Dept, If possible, please have another adult drive you to the Emergency Dept but you need to wear mask when in contact with other people.      Monoclonal Antibody Administration    You may be eligible to receive a new treatment with a monoclonal antibody for preventing " "hospitalization in patients at high risk for complications from COVID-19.   This medication is still experimental and available on a limited basis; it is given through an IV and must be given at an infusion center. Please note that not all people who are eligible will receive the medication since it is in limited supply.     Are you interested in being considered for this medication?  No.   Does the patient fit the criteria: No    If patient qualifies based on above criteria:  \"You will be contacted if you are selected to receive this treatment in the next 1-2 business days.   This is time sensitive and if you are not selected in the next 1-2 business days, you will not receive the medication.  If you do not receive a call to schedule, you have not been selected.\"      Review information with Patient    Your result was positive. This means you have COVID-19 (coronavirus).  We have sent you a letter that reviews the information that I'll be reviewing with you now.    How can I protect others?    If you have symptoms: stay home and away from others (self-isolate) until:    You've had no fever--and no medicine that reduces fever--for 1 full day (24 hours). And       Your other symptoms have gotten better. For example, your cough or breathing has improved. And     At least 10 days have passed since your symptoms started. (If you've been told by a doctor that you have a weak immune system, wait 20 days.)     If you don't have symptoms: Stay home and away from others (self-isolate) until at least 10 days have passed since your first positive COVID-19 test. (Date test collected)    During this time:    Stay in your own room, including for meals. Use your own bathroom if you can.    Stay away from others in your home. No hugging, kissing or shaking hands. No visitors.     Don't go to work, school or anywhere else.     Clean  high touch  surfaces often (doorknobs, counters, handles, etc.). Use a household cleaning spray or " wipes. You'll find a full list on the EPA website at www.epa.gov/pesticide-registration/list-n-disinfectants-use-against-sars-cov-2.     Cover your mouth and nose with a mask, tissue or other face covering to avoid spreading germs.    Wash your hands and face often with soap and water.    Make a list of people you have been in close contact with recently, even if either of you wore a face covering.   ; Start your list from 2 days before you became ill or had a positive test.  ; Include anyone that was within 6 feet of you for a cumulative total of 15 minutes or more in 24 hours. (Example: if you sat next to Fan for 5 minutes in the morning and 10 minutes in the afternoon, then you were in close contact for 15 minutes total that day. Fan would be added to your list.)    A public health worker will call or text you. It is important that you answer. They will ask you questions about possible exposures to COVID-19, such as people you have been in direct contact with and places you have visited.    Tell the people on your list that you have COVID-19; they should stay away from others for 14 days starting from the last time they were in contact with you (unless you are told something different from a public health worker).     Caregivers in these groups are at risk for severe illness due to COVID-19:  o People 65 years and older  o People who live in a nursing home or long-term care facility  o People with chronic disease (lung, heart, cancer, diabetes, kidney, liver, immunologic)  o People who have a weakened immune system, including those who:  - Are in cancer treatment  - Take medicine that weakens the immune system, such as corticosteroids  - Had a bone marrow or organ transplant  - Have an immune deficiency  - Have poorly controlled HIV or AIDS  - Are obese (body mass index of 40 or higher)  - Smoke regularly    Caregivers should wear gloves while washing dishes, handling laundry and cleaning bedrooms and  bathrooms.    Wash and dry laundry with special caution. Don't shake dirty laundry, and use the warmest water setting you can.    If you have a weakened immune system, ask your doctor about other actions you should take.    For more tips, go to www.cdc.gov/coronavirus/2019-ncov/downloads/10Things.pdf.    You should not go back to work until you meet the guidelines above for ending your home isolation. You don't need to be retested for COVID-19 before going back to work--studies show that you won't spread the virus if it's been at least 10 days since your symptoms started (or 20 days, if you have a weak immune system).    Employers: This document serves as formal notice of your employee's medical guidelines for going back to work. They must meet the above guidelines before going back to work in person.    How can I take care of myself?    1. Get lots of rest. Drink extra fluids (unless a doctor has told you not to).    2. Take Tylenol (acetaminophen) for fever or pain. If you have liver or kidney problems, ask your family doctor if it's okay to take Tylenol.     Take either:     650 mg (two 325 mg pills) every 4 to 6 hours, or     1,000 mg (two 500 mg pills) every 8 hours as needed.     Note: Don't take more than 3,000 mg in one day. Acetaminophen is found in many medicines (both prescribed and over-the-counter medicines). Read all labels to be sure you don't take too much.    For children, check the Tylenol bottle for the right dose (based on their age or weight).    3. If you have other health problems (like cancer, heart failure, an organ transplant or severe kidney disease): Call your specialty clinic if you don't feel better in the next 2 days.    4. Know when to call 911: Emergency warning signs include:    Trouble breathing or shortness of breath    Pain or pressure in the chest that doesn't go away    Feeling confused like you haven't felt before, or not being able to wake up    Bluish-colored lips or  face    5. Sign up for [employer]. We know it's scary to hear that you have COVID-19. We want to track your symptoms to make sure you're okay over the next 2 weeks. Please look for an email from [employer]--this is a free, online program that we'll use to keep in touch. To sign up, follow the link in the email. Learn more at [email or web address].    Where can I get more information?    [facility]: [email or web address]    Coronavirus Basics: www.health.[facility].[place]./diseases/coronavirus/basics.html    What to Do If You're Sick: www.cdc.gov/coronavirus/2019-ncov/about/steps-when-sick.html    Ending Home Isolation: www.cdc.gov/coronavirus/2019-ncov/hcp/disposition-in-home-patients.html     Caring for Someone with COVID-19: www.cdc.gov/coronavirus/2019-ncov/if-you-are-sick/care-for-someone.html     [facility] clinical trials (COVID-19 research studies): [email or web address].[facility]-clinical-trials     A Positive COVID-19 letter will be sent via [employer] or the mail. (Exception, no letters sent to Presurgerical/Preprocedure Patients)    Kayla Terrlel LPN

## 2021-12-23 NOTE — RESULT ENCOUNTER NOTE
Negative for Influenza A, Influenza B, and RSV.  Positive Covid19 result, the resuls is managed for the LPN Support Team and the patient will be notified of their positive Covid19 result via [employer] (if active) or they will be contacted by the nurse via phone call if not active on Mychart.  A letter is sent to patient via [employer] or via mail (if no Mychart).

## 2021-12-23 NOTE — DISCHARGE INSTRUCTIONS
Thank you for coming to the [facility] Emergency Department.     Expect results visible in MyChart later today or early tomorrow.

## 2022-06-16 ENCOUNTER — HOSPITAL ENCOUNTER (EMERGENCY)
Facility: CLINIC | Age: 42
Discharge: HOME | End: 2022-06-16
Attending: EMERGENCY MEDICINE | Admitting: EMERGENCY MEDICINE
Payer: COMMERCIAL

## 2022-06-16 VITALS
WEIGHT: 200 LBS | BODY MASS INDEX: 31.39 KG/M2 | TEMPERATURE: 97.1 F | RESPIRATION RATE: 16 BRPM | DIASTOLIC BLOOD PRESSURE: 90 MMHG | SYSTOLIC BLOOD PRESSURE: 113 MMHG | HEART RATE: 83 BPM | OXYGEN SATURATION: 100 % | HEIGHT: 67 IN

## 2022-06-16 DIAGNOSIS — M54.42 ACUTE LEFT-SIDED LOW BACK PAIN WITH LEFT-SIDED SCIATICA: ICD-10-CM

## 2022-06-16 LAB
HCG UR QL: NEGATIVE
HCG UR QL: NEGATIVE
INTERNAL QC OK POCT: NORMAL
INTERNAL QC OK POCT: NORMAL
POCT KIT EXPIRATION DATE: NORMAL
POCT KIT EXPIRATION DATE: NORMAL
POCT KIT LOT NUMBER: NORMAL
POCT KIT LOT NUMBER: NORMAL

## 2022-06-16 PROCEDURE — 96372 THER/PROPH/DIAG INJ SC/IM: CPT | Performed by: EMERGENCY MEDICINE

## 2022-06-16 PROCEDURE — 99284 EMERGENCY DEPT VISIT MOD MDM: CPT | Performed by: EMERGENCY MEDICINE

## 2022-06-16 PROCEDURE — 250N000011 HC RX IP 250 OP 636: Performed by: EMERGENCY MEDICINE

## 2022-06-16 PROCEDURE — 250N000013 HC RX MED GY IP 250 OP 250 PS 637: Performed by: EMERGENCY MEDICINE

## 2022-06-16 PROCEDURE — 81025 URINE PREGNANCY TEST: CPT | Performed by: EMERGENCY MEDICINE

## 2022-06-16 RX ORDER — CYCLOBENZAPRINE HCL 10 MG
5-10 TABLET ORAL 3 TIMES DAILY PRN
Qty: 20 TABLET | Refills: 0 | Status: SHIPPED | OUTPATIENT
Start: 2022-06-16 | End: 2022-06-23

## 2022-06-16 RX ORDER — GABAPENTIN 300 MG/1
300 CAPSULE ORAL ONCE
Status: COMPLETED | OUTPATIENT
Start: 2022-06-16 | End: 2022-06-16

## 2022-06-16 RX ORDER — DEXAMETHASONE SODIUM PHOSPHATE 10 MG/ML
10 INJECTION, SOLUTION INTRAMUSCULAR; INTRAVENOUS ONCE
Status: COMPLETED | OUTPATIENT
Start: 2022-06-16 | End: 2022-06-16

## 2022-06-16 RX ORDER — LIDOCAINE 50 MG/G
2 PATCH TOPICAL EVERY 24 HOURS
Qty: 17 PATCH | Refills: 0 | Status: SHIPPED | OUTPATIENT
Start: 2022-06-16

## 2022-06-16 RX ORDER — ACETAMINOPHEN 500 MG
1000 TABLET ORAL 3 TIMES DAILY
Qty: 42 TABLET | Refills: 0 | Status: SHIPPED | OUTPATIENT
Start: 2022-06-16 | End: 2022-06-23

## 2022-06-16 RX ORDER — PREDNISONE 20 MG/1
20 TABLET ORAL DAILY
Qty: 5 TABLET | Refills: 0 | Status: SHIPPED | OUTPATIENT
Start: 2022-06-16

## 2022-06-16 RX ORDER — CYCLOBENZAPRINE HCL 5 MG
5 TABLET ORAL ONCE
Status: COMPLETED | OUTPATIENT
Start: 2022-06-16 | End: 2022-06-16

## 2022-06-16 RX ORDER — OXYCODONE HYDROCHLORIDE 5 MG/1
5 TABLET ORAL EVERY 6 HOURS PRN
Qty: 6 TABLET | Refills: 0 | Status: SHIPPED | OUTPATIENT
Start: 2022-06-16 | End: 2022-06-19

## 2022-06-16 RX ORDER — GABAPENTIN 300 MG/1
300 CAPSULE ORAL 3 TIMES DAILY
Qty: 21 CAPSULE | Refills: 0 | Status: SHIPPED | OUTPATIENT
Start: 2022-06-16 | End: 2023-10-18

## 2022-06-16 RX ADMIN — DEXAMETHASONE SODIUM PHOSPHATE 10 MG: 10 INJECTION, SOLUTION INTRAMUSCULAR; INTRAVENOUS at 13:09

## 2022-06-16 RX ADMIN — CYCLOBENZAPRINE HYDROCHLORIDE 5 MG: 5 TABLET, FILM COATED ORAL at 13:08

## 2022-06-16 RX ADMIN — GABAPENTIN 300 MG: 300 CAPSULE ORAL at 13:08

## 2022-06-16 ASSESSMENT — ENCOUNTER SYMPTOMS
HEADACHES: 0
DIFFICULTY URINATING: 0
SHORTNESS OF BREATH: 0
DYSURIA: 0
COLOR CHANGE: 0
FEVER: 0
CHILLS: 0
EYE REDNESS: 0
ARTHRALGIAS: 0
CONFUSION: 0
BACK PAIN: 1
ABDOMINAL PAIN: 0
NECK STIFFNESS: 0

## 2022-06-16 NOTE — LETTER
June 16, 2022      To Whom It May Concern:      Ana Leblanc was seen in our Emergency Department today, 06/16/22.  I expect her condition to improve over the next 7 days.  She may return to work/school when improved, or after cleared through referral to Occupational Health.    Sincerely,        Josselyn Maldonado MD

## 2022-06-16 NOTE — DISCHARGE INSTRUCTIONS
Please make an appointment to follow [facility] with [facility] (phone: [phone]) in 5-7 days. [facility] will call you to set up an appointment, though they might need a referral from [facility].

## 2022-06-16 NOTE — ED TRIAGE NOTES
C/o low back pain radiating down legs and up neck  States this happened in the past but relieved spontaneously  Took tylenol with minimal relief

## 2022-06-16 NOTE — ED PROVIDER NOTES
ED Provider Note  [facility]      History     Chief Complaint   Patient presents with     Back Pain     The history is provided by the patient and medical records.     Ana Leblanc is a 41 year old female with a past medical history significant for acute cholecystitis (s/p laparoscopic cholecystectomy-11/2018) who presents to the Emergency Department for evaluation of back pain.  Patient reports this is a middle, left-sided back pain that started yesterday.  She reports this radiates down into the left leg and up into the left side of her neck as well.  She states she has had a similar episode of pain in the past but was unsure of what it was from.  Patient reports she took Tylenol this morning at 6:30 AM.  She states she took 2 more Tylenol before work this morning and tomorrow prior to her ED evaluation.  She states she last took Tylenol at 10 AM this morning.  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  She reports increased pain with twisting and bending motion.  She states her pain is improved while resting.  She otherwise denies any fevers or chills.  No dysuria or other urinary symptoms.  She does report an allergy to ibuprofen and states she had gotten some swelling when she last took this.    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     Malignant neoplasm (H)     colon     Past Surgical History:   Procedure Laterality Date     COLECTOMY SUBTOTAL N/A 07/2010    iliorectal anastamosis     LAPAROSCOPIC CHOLECYSTECTOMY N/A 11/17/2018    Procedure: LAPAROSCOPIC CHOLECYSTECTOMY;  Surgeon: Shane Maldonado MD;  Location: [facility] OR     acetaminophen (TYLENOL) 500 MG tablet  cyclobenzaprine (FLEXERIL) 10 MG tablet  gabapentin (NEURONTIN) 300 MG capsule  lidocaine (LIDODERM) 5 % patch  predniSONE (DELTASONE) 20 MG  "tablet  acetaminophen (TYLENOL) 325 MG tablet  vitamin C (ASCORBIC ACID) 100 MG tablet  Vitamin D, Cholecalciferol, 1000 units TABS      Allergies   Allergen Reactions     Chicken Allergy Hives     Ibuprofen Itching     Family History  No family history on file.  Social History   Social History     Tobacco Use     Smoking status: Never Smoker     Smokeless tobacco: Never Used   Substance Use Topics     Alcohol use: No     Drug use: No      Past medical history, past surgical history, medications, allergies, family history, and social history were reviewed with the patient. No additional pertinent items.       Review of Systems   Constitutional: Negative for chills and fever.   HENT: Negative for congestion.    Eyes: Negative for redness.   Respiratory: Negative for shortness of breath.    Cardiovascular: Negative for chest pain.   Gastrointestinal: Negative for abdominal pain.   Genitourinary: Negative for difficulty urinating and dysuria.   Musculoskeletal: Positive for back pain (See HPI). Negative for arthralgias and neck stiffness.   Skin: Negative for color change.   Neurological: Negative for headaches.   Psychiatric/Behavioral: Negative for confusion.   All other systems reviewed and are negative.      Physical Exam   BP: 107/88  Pulse: 77  Temp: 98  F (36.7  C)  Resp: 16  Height: 170.2 cm (5' 7\")  Weight: 90.7 kg (200 lb)  SpO2: 98 %  Physical Exam  Vitals and nursing note reviewed.   Constitutional:       General: She is not in acute distress.     Appearance: Normal appearance. She is not diaphoretic.   HENT:      Head: Atraumatic.      Mouth/Throat:      Pharynx: No oropharyngeal exudate.   Eyes:      General: No scleral icterus.     Pupils: Pupils are equal, round, and reactive to light.   Cardiovascular:      Rate and Rhythm: Normal rate and regular rhythm.      Heart sounds: Normal heart sounds.   Pulmonary:      Effort: No respiratory distress.      Breath sounds: Normal breath sounds.   Abdominal:     "  General: Bowel sounds are normal.      Palpations: Abdomen is soft.      Tenderness: There is no abdominal tenderness.   Musculoskeletal:         General: No tenderness.   Skin:     General: Skin is warm.      Findings: No rash.   Neurological:      General: No focal deficit present.      Mental Status: She is alert and oriented to person, place, and time.   Psychiatric:         Mood and Affect: Mood normal.         Behavior: Behavior normal.           ED Course     Procedures       No results found for any visits on 06/16/22.  Medications - No data to display     Assessments & Plan (with Medical Decision Making)     41 year old female with a past medical history significant for acute cholecystitis (s/p laparoscopic cholecystectomy-11/2018) who presents to the Emergency Department for evaluation of back pain.  Vital signs stable in triage with a blood pressure 107/88, pulse normal 77, respiration normal 16, pulse ox normal at 98% on room air.  Urine hCG negative.  Patient given gabapentin 3 mg p.o., Flexeril 5 mg p.o., dexamethasone 10 mg IM.  Upon repeat assessment patient symptoms had improved and she was ambulating without difficulty.  Plan for discharge home with follow-up through primary care center within the next week for further evaluation and care along with referral to physical therapy follow-up.  Patient given prescriptions for back pain regimen and she expressed her understanding on anticipatory guidance return precautions to the emergency room.    I have reviewed the nursing notes. I have reviewed the findings, diagnosis, plan and need for follow up with the patient.    New Prescriptions    No medications on file       Final diagnoses:   Acute left-sided low back pain with left-sided sciatica     Vicente HALL, am serving as a trained medical scribe to document services personally performed by Josselyn Maldonado MD, based on the provider's statements to me.      Josselyn HALL MD, was physically  present and have reviewed and verified the accuracy of this note documented by Vicente Jin.     --  Josselyn REIS  [facility] EMERGENCY DEPARTMENT  6/16/2022     Josselyn Maldonado MD  06/21/22 [id number]

## 2022-06-17 ENCOUNTER — HOSPITAL ENCOUNTER (EMERGENCY)
Facility: CLINIC | Age: 42
Discharge: STILL ADMITTED OR ADMINISTRATIVE | End: 2022-06-17
Payer: COMMERCIAL

## 2022-06-17 NOTE — LETTER
June 17, 2022      To Whom It May Concern:      Ana Leblanc was seen in our Emergency Department today, 06/16/22.  I expect her condition to improve over the next 1-2 days.  She may return to work/school when improved, as soon as 6/17/22 or 6/18/2022.     Sincerely,        Chrissy Lipscomb MD

## 2022-06-17 NOTE — ED NOTES
Took pharmacy call on pt due to a typo on her prednisone prescription. Given verbal order to pharmacist for prednisone 40mg daily x5 days.     Pt also requesting new work note to return to work. Provided.    Chrissy Ahn MD  06/17/22 [id number]

## 2023-10-18 ENCOUNTER — HOSPITAL ENCOUNTER (EMERGENCY)
Facility: CLINIC | Age: 43
Discharge: HOME | End: 2023-10-18
Attending: INTERNAL MEDICINE | Admitting: INTERNAL MEDICINE
Payer: COMMERCIAL

## 2023-10-18 VITALS
DIASTOLIC BLOOD PRESSURE: 86 MMHG | OXYGEN SATURATION: 99 % | TEMPERATURE: 97.4 F | SYSTOLIC BLOOD PRESSURE: 118 MMHG | HEART RATE: 75 BPM | RESPIRATION RATE: 18 BRPM

## 2023-10-18 DIAGNOSIS — M54.12 CERVICAL RADICULOPATHY: ICD-10-CM

## 2023-10-18 PROCEDURE — 99283 EMERGENCY DEPT VISIT LOW MDM: CPT | Performed by: INTERNAL MEDICINE

## 2023-10-18 RX ORDER — GABAPENTIN 300 MG/1
300 CAPSULE ORAL 3 TIMES DAILY PRN
Qty: 30 CAPSULE | Refills: 0 | Status: SHIPPED | OUTPATIENT
Start: 2023-10-18

## 2023-10-18 ASSESSMENT — ACTIVITIES OF DAILY LIVING (ADL): ADLS_ACUITY_SCORE: 35

## 2023-10-18 NOTE — ED TRIAGE NOTES
pt  reports  L hand pain  that started last Friday and now goes up her arm and into her neck/head. No injury reported.  Says it is a sharp pain. 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 Pt reports numbness when she sleeps. She has a headache today and that's why she came today

## 2023-10-18 NOTE — LETTER
October 18, 2023      To Whom It May Concern:      Ana Leblanc was seen in our Emergency Department today, 10/18/23.  I expect her condition to improve over the next 2 days.  She may return to work/school when improved.    Sincerely,        Georgina Oviedo MD, MD

## 2023-10-18 NOTE — ED PROVIDER NOTES
ED Provider Note  [facility]      History     Chief Complaint   Patient presents with    Arm Pain    Hand Pain     HPI  Ana Leblanc is a 42 year old female who presents to the ED with c/o worsening sharp L hand pain that began 5 days ago. Pain radiates up her arm and into neck. She denies any recent trauma. She also reports new numbness when she sleeps and headache today, prompting her to come in. No rashes. No F/C. No bladder or bowel symptoms. No lower extremity pain/edema. No chronic medical problems.     Hx of acute cholecystitis s/p laparoscopic cholecystectomy 11/18.    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    Malignant neoplasm (H)     colon     Past Surgical History:   Procedure Laterality Date    COLECTOMY SUBTOTAL N/A 07/2010    iliorectal anastamosis    LAPAROSCOPIC CHOLECYSTECTOMY N/A 11/17/2018    Procedure: LAPAROSCOPIC CHOLECYSTECTOMY;  Surgeon: Shane Maldonado MD;  Location: [facility] OR     gabapentin (NEURONTIN) 300 MG capsule  acetaminophen (TYLENOL) 325 MG tablet  lidocaine (LIDODERM) 5 % patch  predniSONE (DELTASONE) 20 MG tablet  vitamin C (ASCORBIC ACID) 100 MG tablet  Vitamin D, Cholecalciferol, 1000 units TABS      Allergies   Allergen Reactions    Chicken Allergy Hives    Ibuprofen Itching     Family History  No family history on file.  Social History   Social History     Tobacco Use    Smoking status: Never    Smokeless tobacco: Never   Substance Use Topics    Alcohol use: No    Drug use: No      Past medical history, past surgical history, medications, allergies, family history, and social history were reviewed with the patient. No additional pertinent items.      A complete review of systems was performed with pertinent positives and negatives noted in the HPI, and all other systems negative.    Physical Exam   BP: 119/83  Pulse: 85  Temp: 97.4  F (36.3  C)  Resp:  16  SpO2: 98 %  Physical Exam  Vitals and nursing note reviewed.   Constitutional:       General: She is not in acute distress.     Appearance: She is not diaphoretic.   HENT:      Head: Normocephalic and atraumatic.   Eyes:      Extraocular Movements: Extraocular movements intact.      Conjunctiva/sclera: Conjunctivae normal.   Cardiovascular:      Rate and Rhythm: Normal rate and regular rhythm.      Heart sounds: Normal heart sounds. No murmur heard.     No friction rub. No gallop.   Pulmonary:      Effort: Pulmonary effort is normal. No respiratory distress.      Breath sounds: Normal breath sounds. No stridor. No wheezing, rhonchi or rales.   Chest:      Chest wall: No tenderness.   Abdominal:      General: Abdomen is flat. Bowel sounds are normal. There is no distension.      Palpations: Abdomen is soft. There is no mass.      Tenderness: There is no abdominal tenderness. There is no right CVA tenderness, left CVA tenderness, guarding or rebound.      Hernia: No hernia is present.   Musculoskeletal:         General: Normal range of motion.      Cervical back: Normal range of motion and neck supple. Spasms and tenderness present. No swelling, edema, deformity, erythema, signs of trauma, lacerations, rigidity, torticollis, bony tenderness or crepitus. No pain with movement. Normal range of motion.      Thoracic back: Normal.      Lumbar back: Normal.        Back:    Skin:     General: Skin is warm.      Findings: No rash.           ED Course, Procedures, & Data      Procedures                   No results found for any visits on 10/18/23.  Medications - No data to display  Labs Ordered and Resulted from Time of ED Arrival to Time of ED Departure - No data to display  No orders to display          Critical care was not performed.     Medical Decision Making  The patient's presentation was of low complexity (an acute and uncomplicated illness or injury).    The patient's evaluation involved:  history and exam  without other MDM data elements    The patient's management necessitated moderate risk (prescription drug management including medications given in the ED).    Assessment & Plan    Acute cervical radiculopathy with left arm pain and paresthesia, but no red flags, will discharge with neurontin and follow up with her PMD for possible PT.    I have reviewed the nursing notes. I have reviewed the findings, diagnosis, plan and need for follow up with the patient.    Discharge Medication List as of 10/18/2023  6:00 PM          Final diagnoses:   Cervical radiculopathy   INils, am serving as a trained medical scribe to document services personally performed by Georgina Oviedo Md MD based on the provider's statements to me on October 18, 2023.  This document has been checked and approved by the attending provider.    I, Georgina Oviedo Md MD, was physically present and have reviewed and verified the accuracy of this note documented by Nils Kerr medical scribe.      Georgina Oviedo Md MD  [facility] EMERGENCY DEPARTMENT  10/18/2023     Georgnia Oviedo MD  10/18/23 [id number]

## 2024-02-26 ENCOUNTER — HOSPITAL ENCOUNTER (EMERGENCY)
Facility: CLINIC | Age: 44
Discharge: HOME | End: 2024-02-26
Attending: EMERGENCY MEDICINE | Admitting: EMERGENCY MEDICINE
Payer: COMMERCIAL

## 2024-02-26 VITALS
SYSTOLIC BLOOD PRESSURE: 139 MMHG | DIASTOLIC BLOOD PRESSURE: 83 MMHG | OXYGEN SATURATION: 98 % | HEART RATE: 87 BPM | RESPIRATION RATE: 18 BRPM | TEMPERATURE: 98.4 F

## 2024-02-26 DIAGNOSIS — J10.1 INFLUENZA B: ICD-10-CM

## 2024-02-26 LAB
FLUAV RNA SPEC QL NAA+PROBE: NEGATIVE
FLUBV RNA RESP QL NAA+PROBE: POSITIVE
GROUP A STREP BY PCR: NOT DETECTED
RSV RNA SPEC NAA+PROBE: NEGATIVE
SARS-COV-2 RNA RESP QL NAA+PROBE: NEGATIVE

## 2024-02-26 PROCEDURE — 99283 EMERGENCY DEPT VISIT LOW MDM: CPT | Performed by: EMERGENCY MEDICINE

## 2024-02-26 PROCEDURE — 87637 SARSCOV2&INF A&B&RSV AMP PRB: CPT | Performed by: EMERGENCY MEDICINE

## 2024-02-26 PROCEDURE — 250N000012 HC RX MED GY IP 250 OP 636 PS 637: Performed by: EMERGENCY MEDICINE

## 2024-02-26 PROCEDURE — 87651 STREP A DNA AMP PROBE: CPT | Performed by: EMERGENCY MEDICINE

## 2024-02-26 PROCEDURE — 99284 EMERGENCY DEPT VISIT MOD MDM: CPT | Performed by: EMERGENCY MEDICINE

## 2024-02-26 RX ORDER — DEXAMETHASONE 4 MG/1
16 TABLET ORAL ONCE
Qty: 4 TABLET | Refills: 0 | Status: COMPLETED | OUTPATIENT
Start: 2024-02-26 | End: 2024-02-26

## 2024-02-26 RX ADMIN — DEXAMETHASONE 16 MG: 4 TABLET ORAL at 11:59

## 2024-02-26 ASSESSMENT — COLUMBIA-SUICIDE SEVERITY RATING SCALE - C-SSRS
6. HAVE YOU EVER DONE ANYTHING, STARTED TO DO ANYTHING, OR PREPARED TO DO ANYTHING TO END YOUR LIFE?: NO
1. IN THE PAST MONTH, HAVE YOU WISHED YOU WERE DEAD OR WISHED YOU COULD GO TO SLEEP AND NOT WAKE UP?: NO
2. HAVE YOU ACTUALLY HAD ANY THOUGHTS OF KILLING YOURSELF IN THE PAST MONTH?: NO

## 2024-02-26 ASSESSMENT — ACTIVITIES OF DAILY LIVING (ADL): ADLS_ACUITY_SCORE: 36

## 2024-02-26 NOTE — Clinical Note
Ana Leblanc was seen and treated in our emergency department on 2/26/2024.  She may return to work on 02/28/2024.       If you have any questions or concerns, please don't hesitate to call.      Roni Roque MD

## 2024-02-26 NOTE — ED TRIAGE NOTES
Arrives ambulatory with a cough, fever and sore throat. Per patient it started yesterday.      Triage Assessment (Adult)       Row Name 02/26/24 1029          Triage Assessment    Airway WDL WDL        Respiratory WDL    Respiratory WDL WDL        Skin Circulation/Temperature WDL    Skin Circulation/Temperature WDL WDL        Cardiac WDL    Cardiac WDL WDL        Peripheral/Neurovascular WDL    Peripheral Neurovascular WDL WDL        Cognitive/Neuro/Behavioral WDL    Cognitive/Neuro/Behavioral WDL WDL

## 2024-02-26 NOTE — ED PROVIDER NOTES
ED Provider Note  [facility]      History     Chief Complaint   Patient presents with    Fever    Cough     The history is provided by the patient and medical records.     Ana Leblanc is a 43 year old female who presents with 1 day history of fever, cough, and sore throat.  Patient has had 2 days of symptoms, is still able to take down water and food however has pain with swallowing.  Mild change in voice.  Felt warm and feverish with no measured fever at home.  Handling secretions okay.  Mild cough that is nonproductive.  No shortness of breath or chest pain.  No abdominal pain, nausea, vomiting, diarrhea.  Unsure of recent exposure to sick contacts.  Primary complaint today is the sore throat that feels bilateral, no difficulty breathing.    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    Malignant neoplasm (H)     colon     Past Surgical History:   Procedure Laterality Date    COLECTOMY SUBTOTAL N/A 07/2010    iliorectal anastamosis    LAPAROSCOPIC CHOLECYSTECTOMY N/A 11/17/2018    Procedure: LAPAROSCOPIC CHOLECYSTECTOMY;  Surgeon: Shane Maldonado MD;  Location: [facility] OR     acetaminophen (TYLENOL) 325 MG tablet  gabapentin (NEURONTIN) 300 MG capsule  lidocaine (LIDODERM) 5 % patch  predniSONE (DELTASONE) 20 MG tablet  vitamin C (ASCORBIC ACID) 100 MG tablet  Vitamin D, Cholecalciferol, 1000 units TABS      Allergies   Allergen Reactions    Chicken Allergy Hives    Ibuprofen Itching     Family History  No family history on file.  Social History   Social History     Tobacco Use    Smoking status: Never    Smokeless tobacco: Never   Substance Use Topics    Alcohol use: No    Drug use: No         A medically appropriate review of systems was performed with pertinent positives and negatives noted in the HPI, and all other systems negative.    Physical Exam   BP: 139/83  Pulse: 87  Temp: 98.4  F (36.9   C)  Resp: 18  SpO2: 98 %  Physical Exam  Vitals and nursing note reviewed.   Constitutional:       General: She is not in acute distress.     Appearance: Normal appearance. She is not diaphoretic.   HENT:      Head: Normocephalic and atraumatic.      Nose: Nose normal. No congestion or rhinorrhea.      Mouth/Throat:      Mouth: Mucous membranes are moist.      Pharynx: Oropharynx is clear.      Comments: Bilateral peritonsillar erythema / swelling, no exudate   Eyes:      General: No scleral icterus.     Extraocular Movements: Extraocular movements intact.      Conjunctiva/sclera: Conjunctivae normal.      Pupils: Pupils are equal, round, and reactive to light.   Cardiovascular:      Rate and Rhythm: Normal rate and regular rhythm.      Heart sounds: Normal heart sounds.   Pulmonary:      Effort: No respiratory distress.      Breath sounds: Normal breath sounds.   Abdominal:      General: Abdomen is flat.   Musculoskeletal:      Cervical back: Normal range of motion and neck supple.   Skin:     General: Skin is warm.      Findings: No rash.   Neurological:      Mental Status: She is alert.           ED Course, Procedures, & Data      Procedures              Results for orders placed or performed during the hospital encounter of 02/26/24   Asymptomatic Influenza A/B, RSV, & SARS-CoV2 PCR (COVID-19) Nose     Status: Abnormal    Specimen: Nose; Swab   Result Value Ref Range    Influenza A PCR Negative Negative    Influenza B PCR Positive (A) Negative    RSV PCR Negative Negative    SARS CoV2 PCR Negative Negative    Narrative    Testing was performed using the Xpert Xpress CoV2/Flu/RSV Assay on the [employer] GeneXpert Instrument. This test should be ordered for the detection of SARS-CoV-2, influenza, and RSV viruses in individuals who meet clinical and/or epidemiological criteria. Test performance is unknown in asymptomatic patients. This test is for in vitro diagnostic use under the FDA EUA for laboratories certified  under CLIA to perform high or moderate complexity testing. This test has not been FDA cleared or approved. A negative result does not rule out the presence of PCR inhibitors in the specimen or target RNA in concentration below the limit of detection for the assay. If only one viral target is positive but coinfection with multiple targets is suspected, the sample should be re-tested with another FDA cleared, approved, or authorized test, if coinfection would change clinical management. This test was validated by the [facility] [employer]. These laboratories are certified under the Clinical Laboratory Improvement Amendments of 1988 (CLIA-88) as qualified to perform high complexity laboratory testing.   Group A Streptococcus PCR Throat Swab     Status: Normal    Specimen: Throat; Swab   Result Value Ref Range    Group A strep by PCR Not Detected Not Detected    Narrative    The Xpert Xpress Strep A test, performed on the [employer], is a rapid, qualitative in vitro diagnostic test for the detection of Streptococcus pyogenes (Group A ß-hemolytic Streptococcus, Strep A) in throat swab specimens from patients with signs and symptoms of pharyngitis. The Xpert Xpress Strep A test can be used as an aid in the diagnosis of Group A Streptococcal pharyngitis. The assay is not intended to monitor treatment for Group A Streptococcus infections. The Xpert Xpress Strep A test utilizes an automated real-time polymerase chain reaction (PCR) to detect Streptococcus pyogenes DNA.     Medications   dexAMETHasone (DECADRON) tablet 16 mg (16 mg Oral $Given 2/26/24 5573)     Labs Ordered and Resulted from Time of ED Arrival to Time of ED Departure   INFLUENZA A/B, RSV, & SARS-COV2 PCR - Abnormal       Result Value    Influenza A PCR Negative      Influenza B PCR Positive (*)     RSV PCR Negative      SARS CoV2 PCR Negative     GROUP A STREPTOCOCCUS PCR THROAT SWAB - Normal    Group A strep by PCR Not Detected        No orders to display          Critical care was not performed.     Medical Decision Making  The patient's presentation was of low complexity (an acute and uncomplicated illness or injury).    The patient's evaluation involved:  ordering and/or review of 2 test(s) in this encounter (see separate area of note for details)    The patient's management necessitated moderate risk (prescription drug management including medications given in the ED).    Assessment & Plan    Ana Leblanc is a 43 year old female who presents with 1 day history of fever, cough, and sore throat.  Patient is nontoxic-appearing on exam, has vital signs within normal limits with exception of mildly elevated blood pressure at 139/83.  Patient is afebrile.  She does have bilateral peritonsillar swelling without evidence of abscess or uvular deviation.  She is speaking without concern for airway obstruction, no stridor, handling secretions and swallowing.  She was worked up with viral swab for COVID, influenza, and RSV and came back positive for influenza B.  Due to the component from pharyngitis, she was given Decadron in the department.  Discharged with outpatient follow-up and return precautions.    I have reviewed the nursing notes. I have reviewed the findings, diagnosis, plan and need for follow up with the patient.    New Prescriptions    No medications on file       Final diagnoses:   Influenza B       Roni Roque MD  [facility] EMERGENCY DEPARTMENT  2/26/2024     Roni Roque MD  02/26/24 1212

## 2024-08-04 ENCOUNTER — HOSPITAL ENCOUNTER (EMERGENCY)
Facility: CLINIC | Age: 44
Discharge: HOME | End: 2024-08-04
Attending: EMERGENCY MEDICINE | Admitting: EMERGENCY MEDICINE
Payer: COMMERCIAL

## 2024-08-04 VITALS
OXYGEN SATURATION: 100 % | HEART RATE: 77 BPM | RESPIRATION RATE: 16 BRPM | DIASTOLIC BLOOD PRESSURE: 85 MMHG | TEMPERATURE: 98.2 F | SYSTOLIC BLOOD PRESSURE: 123 MMHG

## 2024-08-04 DIAGNOSIS — R21 RASH AND NONSPECIFIC SKIN ERUPTION: ICD-10-CM

## 2024-08-04 PROCEDURE — 99283 EMERGENCY DEPT VISIT LOW MDM: CPT | Performed by: EMERGENCY MEDICINE

## 2024-08-04 PROCEDURE — 99284 EMERGENCY DEPT VISIT MOD MDM: CPT | Performed by: EMERGENCY MEDICINE

## 2024-08-04 RX ORDER — BENZOCAINE/MENTHOL 6 MG-10 MG
LOZENGE MUCOUS MEMBRANE 2 TIMES DAILY
Qty: 30 G | Refills: 0 | Status: SHIPPED | OUTPATIENT
Start: 2024-08-04

## 2024-08-04 RX ORDER — DIPHENHYDRAMINE HCL 25 MG
25 TABLET ORAL EVERY 8 HOURS PRN
Qty: 20 TABLET | Refills: 0 | Status: SHIPPED | OUTPATIENT
Start: 2024-08-04

## 2024-08-04 ASSESSMENT — ACTIVITIES OF DAILY LIVING (ADL)
ADLS_ACUITY_SCORE: 36
ADLS_ACUITY_SCORE: 34

## 2024-08-04 ASSESSMENT — COLUMBIA-SUICIDE SEVERITY RATING SCALE - C-SSRS
6. HAVE YOU EVER DONE ANYTHING, STARTED TO DO ANYTHING, OR PREPARED TO DO ANYTHING TO END YOUR LIFE?: NO
2. HAVE YOU ACTUALLY HAD ANY THOUGHTS OF KILLING YOURSELF IN THE PAST MONTH?: NO
1. IN THE PAST MONTH, HAVE YOU WISHED YOU WERE DEAD OR WISHED YOU COULD GO TO SLEEP AND NOT WAKE UP?: NO

## 2024-08-04 NOTE — ED TRIAGE NOTES
Pt ate at a new restaurant Friday with shrimp and has had severe itching and rash since. Pt has tried cold showers that are ineffective for the itching. Pt allergic to avocado and there was some in the sauce.     Triage Assessment (Adult)       Row Name 08/04/24 1032          Triage Assessment    Airway WDL WDL        Respiratory WDL    Respiratory WDL WDL        Skin Circulation/Temperature WDL    Skin Circulation/Temperature WDL WDL        Cardiac WDL    Cardiac WDL WDL        Peripheral/Neurovascular WDL    Peripheral Neurovascular WDL WDL        Cognitive/Neuro/Behavioral WDL    Cognitive/Neuro/Behavioral WDL WDL

## 2024-08-04 NOTE — LETTER
August 4, 2024      To Whom It May Concern:      Ana Leblanc was seen in our Emergency Department today, 08/04/24. Please excuse her from work today.    Sincerely,        Brianne Morton MD

## 2024-08-04 NOTE — ED PROVIDER NOTES
ED Provider Note  [facility]      History     Chief Complaint   Patient presents with    Allergic Reaction     HPI  Ana Leblanc is a 43 year old female who presents for concern of an allergic reaction.    Patient states she accidentally ate avocado Friday night and she is allergic to avocados.  Patient began developing itching over her entire body.  Saturday, patient began throwing up.  Patient has been taking cold showers to try to relieve itching.  Now she presents with  areas rash and itching--on her left forearm, right lateral shin area and on her back.  She has not taken Benadryl or other medications for itching.  She denies fevers, oral swelling, shortness of breath or difficulty breathing. She has had no further vomiting.  Patient works in a nursing home.    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    Malignant neoplasm (H)     colon     Past Surgical History:   Procedure Laterality Date    COLECTOMY SUBTOTAL N/A 07/2010    iliorectal anastamosis    LAPAROSCOPIC CHOLECYSTECTOMY N/A 11/17/2018    Procedure: LAPAROSCOPIC CHOLECYSTECTOMY;  Surgeon: Shane Maldonado MD;  Location: [facility] OR     diphenhydrAMINE (BENADRYL) 25 MG tablet  hydrocortisone (CORTAID) 1 % external cream  acetaminophen (TYLENOL) 325 MG tablet  gabapentin (NEURONTIN) 300 MG capsule  lidocaine (LIDODERM) 5 % patch  predniSONE (DELTASONE) 20 MG tablet  vitamin C (ASCORBIC ACID) 100 MG tablet  Vitamin D, Cholecalciferol, 1000 units TABS      Allergies   Allergen Reactions    Avocado Hives    Chicken Allergy Hives    Ibuprofen Itching     Family History  History reviewed. No pertinent family history.  Social History   Social History     Tobacco Use    Smoking status: Never    Smokeless tobacco: Never   Substance Use Topics    Alcohol use: No    Drug use: No      A medically appropriate review of systems was performed with  pertinent positives and negatives noted in the HPI, and all other systems negative.    Physical Exam   BP: 123/85  Pulse: 77  Temp: 98.2  F (36.8  C)  Resp: 16  SpO2: 100 %/85   Pulse 77   Temp 98.2  F (36.8  C) (Oral)   Resp 16   SpO2 100%    Physical Exam  Skin:             Physical Exam   Constitutional:   well nourished, well developed, resting comfortably   HENT:   Head: Normocephalic and atraumatic.   Cardiovascular: regular rate and rhythm without murmurs or gallops  Pulmonary/Chest: Clear to auscultation bilaterally, with no wheezes or retractions. No respiratory distress.  GI: Soft with good bowel sounds.  Non-tender, non-distended, with no guarding, no rebound, no peritoneal signs.   Musculoskeletal:  no edema  Skin: Skin is warm and dry.  Small areas of patchy redness and excoriation to the left forearm distal to the elbow, right lower leg laterally and back.with excoriations  Neurological: alert and oriented to person, place, and time. Nonfocal exam  Psychiatric:  normal mood and affect.      ED Course, Procedures, & Data      Procedures           No results found for any visits on 08/04/24.  Medications - No data to display  Labs Ordered and Resulted from Time of ED Arrival to Time of ED Departure - No data to display  No orders to display          Critical care was not performed.     Medical Decision Making  The patient's presentation was of low complexity (an acute and uncomplicated illness or injury).    The patient's evaluation involved:  history and exam without other MDM data elements    The patient's management necessitated moderate risk (prescription drug management including medications given in the ED).    Assessment & Plan        I have reviewed the nursing notes.   Emergency Department course:  The patient was seen and examined at 1049 am in triage.   The patient has scattered areas of nonspecific rash after eating an avocado a day and a half ago.  She is nontoxic-appearing with a  normal work of breathing and normal vital signs.  She has no respiratory symptoms.  She has not tried any medications for itching.    I believe she is safe to be discharged home with close outpatient follow-up.  I prescribed Benadryl that she can use as needed for itching.  I told her this medication may make her tired.  In addition, I prescribed hydrocortisone cream to use topically twice a day to the areas of itching.  She should wash the area with regular soap and water and use cool compresses.  I recommend she follow-up with her regular doctor for reevaluation within 1 to 2 weeks  I counseled the patient in my usual and standard fashion for rash and itching and reasons to return to the Emergency Department.       I have reviewed the findings, diagnosis, plan and need for follow up with the patient.    Discharge Medication List as of 8/4/2024 11:22 AM        START taking these medications    Details   diphenhydrAMINE (BENADRYL) 25 MG tablet Take 1 tablet (25 mg) by mouth every 8 hours as needed for itching or allergies, Disp-20 tablet, R-0, E-Prescribe      hydrocortisone (CORTAID) 1 % external cream Apply topically 2 times dailyDisp-30 g, [id number]             Final diagnoses:   Rash and nonspecific skin eruption   I, Blayne Pompa, am serving as a trained medical scribe to document services personally performed by Brianne Morton MD, based to the provider's statements to me.     IBrianne MD, was physically present and have reviewed and verified the accuracy of this note documented by Blayne Pompa.   This note was created in part by the use of Dragon voice recognition dictation system. Inadvertent grammatical errors and typographical errors may still exist.  MD Brianne Smith MD  [facility] EMERGENCY DEPARTMENT  8/4/2024     Brianne Morton MD  08/04/24 3961

## 2024-08-04 NOTE — DISCHARGE INSTRUCTIONS
You can try to take Benadryl for itching.  This medicine will make you sleepy and you should not drive while taking this medication.  Alternatively, you can buy cetirizine (Zyrtec) to see if this works for the itching.  Zyrtec does not make you sleepy.  Use hydrocortisone cream twice a day to the areas of itching.  You should wash the area with soap and water and use cool compresses to the areas of itching  Follow-up with your regular doctor as needed for reevaluation

## 2024-08-29 DIAGNOSIS — Z12.11 ENCOUNTER FOR SCREENING COLONOSCOPY: Primary | ICD-10-CM

## 2024-09-04 ENCOUNTER — TELEPHONE (OUTPATIENT)
Dept: GASTROENTEROLOGY | Facility: CLINIC | Age: 44
End: 2024-09-04
Payer: COMMERCIAL

## 2024-09-04 NOTE — TELEPHONE ENCOUNTER
"Endoscopy Scheduling Screen    Have you had a positive Covid test in the last 14 days?  No    What is your communication preference for Instructions and/or Bowel Prep?   Mail/USPS and MyChart sign up sent as well.    What insurance is in the chart?  Other:  [facility]    Ordering/Referring Provider: CHRISTIANNE LEIGH   (If ordering provider performs procedure, schedule with ordering provider unless otherwise instructed. )    BMI: Estimated body mass index is 31.32 kg/m  as calculated from the following:    Height as of 6/16/22: 1.702 m (5' 7\").    Weight as of 6/16/22: 90.7 kg (200 lb).     Sedation Ordered  moderate sedation.   If patient BMI > 50 do not schedule in [facility].    If patient BMI > 45 do not schedule at [facility].    Are you taking methadone or Suboxone?  No    Have you had difficulties, pain, or discomfort during past endoscopy procedures?  No    Are you taking any prescription medications for pain 3 or more times per week?   NO, No RN review required.    Do you have a history of malignant hyperthermia?  No    (Females) Are you currently pregnant?   No     Have you been diagnosed or told you have pulmonary hypertension?   No    Do you have an LVAD?  No    Have you been told you have moderate to severe sleep apnea?  No    Have you been told you have COPD, asthma, or any other lung disease?  No    Do you have any heart conditions?  No     Have you ever had or are you waiting for an organ transplant?  No. Continue scheduling, no site restrictions.    Have you had a stroke or transient ischemic attack (TIA aka \"mini stroke\" in the last 6 months?   No    Have you been diagnosed with or been told you have cirrhosis of the liver?   No    Are you currently on dialysis?   No    Do you need assistance transferring?   No    BMI: Estimated body mass index is 31.32 kg/m  as calculated from the following:    Height as of 6/16/22: 1.702 m (5' 7\").    Weight as of 6/16/22: 90.7 kg (200 lb).     Is patients BMI > 40 and " scheduling location [facility]?  No    Do you take an injectable medication for weight loss or diabetes (excluding insulin)?  No    Do you take the medication Naltrexone?  No    Do you take blood thinners?  No       Prep   Are you currently on dialysis or do you have chronic kidney disease?  No    Do you have a diagnosis of diabetes?  No    Do you have a diagnosis of cystic fibrosis (CF)?  No    On a regular basis do you go 3 -5 days between bowel movements?  No    BMI > 40?  No    Preferred Pharmacy:    [id number] - [place], [place] - [address] AT [facility] [address]  [address] [facility] [address]  Phone: [phone] Fax: [phone]      Final Scheduling Details     Procedure scheduled  Colonoscopy    Surgeon:  REESE     Date of procedure:  9/11/2024     Pre-OP / PAC:   No - Not required for this site.    Location  [facility]  Prefers Wednesday/Friday    Sedation   MAC/Deep Sedation  [facility]      Patient Reminders:   You will receive a call from a Nurse to review instructions and health history.  This assessment must be completed prior to your procedure.  Failure to complete the Nurse assessment may result in the procedure being cancelled.      On the day of your procedure, please designate an adult(s) who can drive you home stay with you for the next 24 hours. The medicines used in the exam will make you sleepy. You will not be able to drive.      You cannot take public transportation, ride share services, or non-medical taxi service without a responsible caregiver.  Medical transport services are allowed with the requirement that a responsible caregiver will receive you at your destination.  We require that drivers and caregivers are confirmed prior to your procedure.

## 2024-09-05 ENCOUNTER — TELEPHONE (OUTPATIENT)
Dept: GASTROENTEROLOGY | Facility: CLINIC | Age: 44
End: 2024-09-05
Payer: COMMERCIAL

## 2024-09-05 ENCOUNTER — APPOINTMENT (OUTPATIENT)
Dept: INTERPRETER SERVICES | Facility: CLINIC | Age: 44
End: 2024-09-05
Payer: COMMERCIAL

## 2024-09-05 DIAGNOSIS — Z12.11 ENCOUNTER FOR SCREENING COLONOSCOPY: Primary | ICD-10-CM

## 2024-09-05 NOTE — TELEPHONE ENCOUNTER
Pre visit planning completed.      Procedure details:    Patient scheduled for Colonoscopy on 9.11.2024.     Arrival time: 1430. Procedure time 1530    Facility location: [facility]; [address], [place] [address]. Check in location: 2nd Floor.    Sedation type: MAC    Pre op exam needed? No.    Indication for procedure: screening colonoscopy, hx of colon CA      Chart review:     Electronic implanted devices? No    Recent diagnosis of diverticulitis within the last 6 weeks? No      Medication review:    Diabetic? Prediabetic.     Anticoagulants? No    Weight loss medication/injectable? No.    NSAIDS? No NSAID medications per patient's medication list.  RN will verify with pre-assessment call.    Other medication HOLDING recommendations:  N/A      Prep for procedure:     Bowel prep recommendation: Standard Golytely  Due to:language barrier, scheduled in less than 1 week    Verify medications prior to sending prep instructions. Send instructions via mychart and letter.        Rach Waller RN  Endoscopy Procedure Pre Assessment RN  [phone] option 4

## 2024-09-05 NOTE — LETTER
September 6, 2024      Ana Leblanc  [address] [facility] [address]              Dear Ana,  Colonoscopy     Procedure date: 9/11/24    Anticipated arrival time: 2:30 AM   (Please note that arrival times may change)    Facility location: [facility]; [address], [place] [address]. Check in location: 2nd Floor   Important Procedure Reminders:     Prep Instructions:   Instructions on how to prepare for your upcoming procedure are found in the following pages. Please read instructions carefully. Deviation from instructions may result in less than desired outcomes and procedure may need to be rescheduled.   If you have additional questions regarding how to prepare for your upcoming procedure, contact our endoscopy pre assessment nurses at [phone] option 4 Monday through Friday 7:00am-5:00pm     Policy:   The medications used during the procedure will make you sleepy, so you won't be able to drive. On the day of your procedure, please have an adult ready to drive you home and stay with you for the next 24 hours.   You can't use public transportation, ride-share services, or non-medical taxi services without a responsible caregiver. Medical transport services are okay, but a caregiver must be there to receive you at your destination.   Make sure your  and caregiver are confirmed before your procedure.    Day of procedure:  Please keep in mind that arrival times may change. Let your  know there might be a one-hour window for changes.  We ask that you please check in at the  with your . Your  should remain on campus.  Expect to be at the procedure center for about 1.5-2.5 hours.    Please do not wear jewelry (i.e. earrings, rings, necklaces, watches, etc). Leave your purse, billfold, credit cards, and other valuables at home.   Bring insurance card and ID.     To cancel or reschedule your procedure:   Within 14 days of your procedure  if you develop any flu-like symptoms (such as fever, cough, shortness of breath), COVID-19 like symptoms or exposure to COVID-19, contact our endoscopy team at [phone] option 4 to determine if procedure can be completed or needs to be delayed.   If you need to cancel or reschedule, our endoscopy scheduling team can be reached at [phone], option 2. Monday through Friday, 7:00am-5:00pm.    Medication Reminders:    Please note the following medication holding recommendations:   N/A    ----------------------------------------------------------------------------------------------------------------------------------------------   Standard Golytely (Colyte, Nulytely) Bowel Prep   Prep instructions for your colonoscopy   For prep questions, please call: [facility] - [address], [place] [address] -  [phone] option 4    Please read these instructions carefully at least 7 days prior to your colonoscopy procedure. Be sure to follow all directions completely. The inside of your colon must be clean to allow for a complete examination for the presence of any growths, polyps, and/or abnormalities, as well as their biopsy or removal. A number of tips are included in order to make this part of the procedure as comfortable as possible.    Getting ready    prescription at the pharmacy. Bowel prep scripts were sent on 24 to:    [id number] - [place], [place] - [address] AT [facility] OF [address] [place] [address]   Included in the kit will be the followin Dulcolax (Bisacodyl) 5mg tablets  1 container of Polyethylene Glycol (Golytely, Colyte, Nulytely, Gavilyte-G)    A nurse will call you to go over your appointment details and prep instructions. Not completing the nurse call could result with your appointment being cancelled.    You must arrange for an adult to drive you home after your exam. Your colonoscopy cannot be done unless you have a ride. If you need  to use public transportation, someone must ride with you and stay with you for up to 24 hours.                   7 days before procedure   Consult with your prescribing provider about stopping any:     Diabetic/Weight Loss Injectable Medication GLP-1 agonist (such as Exenatide (Byetta, Bydureon),  Mounjaro (Tirzepatide).  Ozempic (Semaglutide). Semaglutide. Symlin (Pamlintide), Tanzeum (Albiglutide). Tirzepatide-Weight Management (Zepbound), Trulicity (Dulaglutide), Victoza (Saxenda, Liraglutide), Wegovy (Semaglutide) please follow below guidelines for holding:    For weekly injection HOLD 7 days before procedure.  For once or twice a day injection HOLD the day before procedure and day of procedure.  For oral, daily dosing (Rybelsus) HOLD 7 days before procedure.    Blood thinning and/or anti platelet medications: such as Coumadin, Plavix, Xarelto, Eliquis, Lovenox or others, these medications may need to be stopped temporarily before your procedure.     If you take insulin for diabetes, ask your prescribing provider for instructions on how to manage this medication while preparing for a colonoscopy.     NSAIDs medications such as Sulindac, Celebrex, Mobic, Relafen or others may need to be stopped before the procedure. This will be discussed during nurse review call or you can reach out to your prescribing provider.     Stop taking iron (ferrous sulfate), multivitamins that contain iron, and/or fiber supplements (Metamucil, Benefiber, Psyllium husk powder, Fibercon, Bran, etc.).      Stop eating whole kernel corn, popcorn, nuts, and foods that contain seeds. These can stay in the colon for many days and they can clog up the colonoscope.                                   3 days before procedure     Begin a low-fiber diet (see examples below). No Olestra (a fat substitute).    Consume no more than 10-15 grams of fiber each day.     It is important to stay hydrated. Drink at least eight 8-ounce glasses of water a day.       LOW FIBER DIET   You can have:   Do not have:    Starches: White bread, rolls, biscuits, croissants, Upperglade toast, white flour tortillas, waffles, pancakes, Occitan toast; white rice, noodles, pasta, macaroni; cooked and peeled potatoes; plain crackers, saltines; cooked farina or cream of rice; puffed rice, corn flakes, Rice Krispies, Special K      Vegetables: tender cooked and canned, vegetable broths     Fruits and fruit juices: Strained fruit juice, canned fruit without seeds or skin (not pineapple), applesauce, pear sauce, ripe bananas, melons (not watermelon)     Milk products: Milk (plain or flavored), cheese, cottage cheese, yogurt (no berries), custard, ice cream       Proteins: Tender, well-cooked ground beef, lamb, veal, ham, pork, chicken, turkey, fish or organ meat, Tofu, eggs, creamy peanut butter      Fats and condiments:  Margarine, butter, oils, mayonnaise, sour cream, salad dressing, plain gravy; spices, cooked herbs; sugar, clear jelly, honey, syrup      Snacks, sweets and drinks: Pretzels, hard candy; plain cakes and cookies (no nuts or seeds); gelatin, plain pudding, sherbet, Popsicles; coffee, tea, carbonated ( fizzy ) drinks    Starches: Breads or rolls that contain nuts, seeds or fruit; whole wheat or whole grain breads that contain more than 2 grams of fiber per serving; cornbread; corn or whole wheat tortillas; potatoes with skin; brown rice, wild rice, quinoa, kasha (buckwheat), and oatmeal      Vegetables: Any raw or steamed vegetables; vegetables with seeds; corn in any form      Fruits and fruit juices: Prunes, prune juice, raisins and other dried fruits, berries and other fruits with seeds, canned pineapple juices with pulp such as orange, grapefruit, pineapple or tomato juice     Milk products: Any yogurt with nuts, seeds or berries      Proteins: Tough, fibrous meats with gristle; cooked dried beans, peas or lentils; crunchy peanut butter     Fats and condiments: Pickles, olives,  relish, horseradish; jam, marmalade, preserves      Snacks, sweets and drinks: Popcorn, nuts, seeds, granola, coconut, candies made with nuts or seeds; all desserts that contain nuts, seeds, raisins and other dried fruits, coconut, whole grains or bran.                                                     1 day before procedure     You can have a light, low-fiber breakfast. But drink only clear liquids after 9 a.m. (see list below).    Drink at least eight to ten 8-ounce glasses of water throughout the day. ? ? ? ? ? ? ? ?    Fill the container of Golytely with a full gallon of warm water. Cover and shake until well mixed. Chill for 3 hours in refrigerator until it is time to drink solution.    Stop taking NSAIDs pain relievers, such as Advil, Ibuprofen, Motrin, etc. You may take Tylenol.    CLEAR LIQUID DIET:  You can have: Do not have:    Water, tea, coffee (no milk or cream)   Soda pop, Gatorade (not red or purple)   Coconut water   Jell-O, Popsicles (no milk or fruit pieces - not red or purple)   Fat-free soup broth or bouillon   Plain hard candy, such as clear life savers (not red or purple)   Clear juices and fruit-flavored drinks, such as apple juice, white grape juice, Hi-C, and Dante-Aid (not red or purple)  Milk or milk products such as ice cream, malts or shakes, or coffee creamer   Red or purple drinks of any kind such as cranberry juice, grape juice or Dante-Aid. Avoid red or purple Jell-O, Popsicles, sorbet, sherbet and candy   Juices with pulp such as orange, grapefruit, pineapple or tomato juice   Cream soups of any kind   Alcohol and beer   Protein drinks or protein powder     Step 1     At 3 PM, take 2 Dulcolax (Bisacodyl) tablets.   At 6 PM, start drinking one 8-ounce glass of Golytely mixture every 15 minutes until the container is HALF empty. Drink each glass quickly. Store the rest in the refrigerator.   Continue to drink clear liquids    Step 2       If you arrive for your procedure AFTER 11  AM:  At 6 AM, take 2 Dulcolax (Bisacodyl) tablets  At 6 AM, start drinking the other half of the Golytely container. Drink one 8-ounce glass of Golytely mixture every 15 minutes until the container is empty. Drink each glass quickly.                 Reminders While Drinking Laxatives:     After you start drinking the solution, stay near a toilet. You may have watery stools (diarrhea), mild cramping, bloating, and nausea. You may want to use Vaseline on the skin around your anus after each bowel movement or use wet wipes to prevent irritation. Bowel movements will be liquid and dark in color at first and then should turn clear yellow in color. Drinking the prepared solution may make you cold, wearing warm clothing can be helpful.    Some find it helpful to:  For added flavor, include a crystal light lemonade packet in each glass of Golytely, rather than mixing it into the entire prepared mixture.   In between each glass, try sucking on a lemon or a piece of hard candy to help diminish the flavor of the preparation.  Drinking from a straw can help minimize the taste of the prepared mixture.    If you have nausea or vomiting during drinking the solution, rinse your mouth with water and take a 15-30 minute break and then continue drinking solution.     Day of procedure     2 hours before your arrival time stop drinking all liquids, including water.   Do not smoke or swallow anything, including water or gum for at least 2 hours before your arrival time. This is a safety issue. Your procedure could be cancelled if you do not follow directions.  No chewing tobacco 6 hours prior to procedure arrival time.     You may take your necessary morning medications with sips of water (4 ounces).   Do not take diabetes medicine by mouth until after your exam.  If you have asthma, bring your inhalers.  Please perform your nebulizer treatments and airway clearance therapy in the morning prior to the procedure (if applicable).    Arrive  with a responsible adult who can drive you home and stay with you for up to 24 hours. The medications used during the procedure will make you sleepy, so you won't be able to drive yourself home.   You cannot use public transportation, ride-share services, or non-medical taxi services without a responsible caregiver. Medical transport services are okay, but a caregiver must be there to receive you at your destination.  Please check in with your  when you arrive. Drivers should stay on campus.    Expect to be at the procedure center for about 1.5-2.5 hours.    Do not wear jewelry (i.e. earrings, rings, necklaces, watches, etc.). Leave your purse, billfold, credit cards, and other valuables at home.      Bring insurance card and ID.               Answers to Commonly Asked Questions     How soon can I eat after the procedure?  You may resume your normal diet when you feel ready, unless advised otherwise by the doctor performing your procedure. We recommend starting with a light meal.   Do not drink alcohol for 24 hours after your procedure.  You may resume normal activities (work, exercise, etc.) after 24 hours.    How will I feel after the procedure?  It is normal to feel bloated and gassy after your procedure. Walking will help move the air through your colon. You can take non-aspirin pain relievers that contain acetaminophen (Tylenol).  If you are having sedation, we require a responsible adult to take you home for your safety. The sedation medicines used to relax you during the procedure can impair your judgement and reaction time, and make you forgetful and possibly a little unsteady.  Do not drive, make any important decisions, or sign any legal documents for 24 hours after your procedure.    When will I get my test results?  You should have your procedure results and any lab results (if applicable) by letter, MyChart message, or phone call within 2 weeks. If you have any questions, please call the doctor  that referred you for the procedure.    How do I know if my colon is cleaned out?   After completing the bowel prep, your bowel movements should be all liquid and yellow. Your bowel movements will look similar to urine in the toilet. If there are pieces of stool (poop) in the toilet, or if you can't see to the bottom of the toilet, please call our office for advice. Call [phone] and ask to speak with a nurse.    Why is the Golytely bowel prep taken in several steps?   The stool is flushed out by a large wave of fluid going through the colon. Just sipping a large volume of the solution will not achieve the desired result. Studies have shown that two smaller waves (or more in some cases) are better than one large one.      What if I need to cancel or reschedule my procedure?  Contact our endoscopy scheduling team at [phone], option 2. Monday through Friday, 7:00am-5:00pm.

## 2024-09-05 NOTE — TELEPHONE ENCOUNTER
RN asked the patient if they have had any issues in the past with being fully cleaned out for a colonoscopy.    Patient verbalized that she may not have been cleaned out in the past. Pt is unsure and cannot remember what bowel prep she has taken in the past. RN is unable to see past colonoscopy reports.     Per pt they are followed by Dr. Del Angel.      RN sent a message to Dr. Del Angel to verify if pt can proceed with Standard Golytely prep or an Extended Golytely prep.  --------------------------------------------------------------------------------------------------------------------        Faizan  via phone.    Pre assessment started for upcoming procedure.   (Please see previous telephone encounter notes for complete details)      Procedure details:    Arrival time and facility location reviewed.    Pre op exam needed? No.    Designated  policy reviewed. Instructed to have someone stay 24  hours post procedure.       Medication review:    Medications reviewed. Please see supporting documentation below. Holding recommendations discussed (if applicable).       Rach Waller RN  Endoscopy Procedure Pre Assessment   [phone] option 4

## 2024-09-06 RX ORDER — BISACODYL 5 MG/1
TABLET, DELAYED RELEASE ORAL
Qty: 4 TABLET | Refills: 0 | Status: SHIPPED | OUTPATIENT
Start: 2024-09-06

## 2024-09-06 NOTE — TELEPHONE ENCOUNTER
"OK for Standard Golytely prep per Dr. Del Angel:   \"Bean, MD Sridhar Hutchinson Samantha, RN  Standard prep.  She has had most of her colon removed.  AJT\"    Will need to call patient with Nauruan  during business hours to review prep instructions and finish pre assessment call.       Prep for procedure:    Bowel prep recommendation: Standard Golytely. Bowel prep prescription sent to [id number] - [place], [place] - [address] AT [facility] OF [address] [place] [address]  Due to: language barrier. , provider request/ordered., and scheduled within a week.    Prep instructions sent via leemailt and letter       Sendy Jones RN  Endoscopy Procedure Pre-Assessment RN  [phone], option 4  "

## 2024-09-08 ENCOUNTER — HEALTH MAINTENANCE LETTER (OUTPATIENT)
Age: 44
End: 2024-09-08

## 2024-09-09 NOTE — TELEPHONE ENCOUNTER
Utilized Mauritanian  ID#: [id number] to place call.    Pre assessment completed for upcoming procedure.   (Please see previous telephone encounter notes for complete details)      Prep for procedure:     Procedure prep instructions reviewed.  (Standard Golytely)    Patient verbalized understanding and had no questions or concerns at this time.      Sendy Jones RN  Endoscopy Procedure Pre Assessment   [phone] option 4

## 2024-09-11 ENCOUNTER — TRANSFERRED RECORDS (OUTPATIENT)
Dept: HEALTH INFORMATION MANAGEMENT | Facility: CLINIC | Age: 44
End: 2024-09-11
Payer: COMMERCIAL

## 2024-09-12 NOTE — ED AVS SNAPSHOT
"[facility]  ED Nurse Handoff Report    ED Chief complaint: Abdominal Pain and Nausea & Vomiting  . ED Diagnosis:   Final diagnoses:   None       Allergies: No Known Allergies    Code Status: Full Code    Activity level - Baseline/Home:  independent.  Activity Level - Current:   standby.   Lift room needed: No.   Bariatric: No   Needed: No   Isolation: No.   Infection: Not Applicable.     Respiratory status: Room air    Vital Signs (within 30 minutes):   Vitals:    09/12/24 0306   BP: 136/86   Pulse: 92   Resp: 20   Temp: 98.7  F (37.1  C)   TempSrc: Temporal   SpO2: 100%   Weight: 49 kg (108 lb)   Height: 1.575 m (5' 2\")       Cardiac Rhythm:  ,      Pain level:    Patient confused: No.   Patient Falls Risk: nonskid shoes/slippers when out of bed, patient and family education, and assistive device/personal items within reach.   Elimination Status: Has voided     Patient Report - Initial Complaint: abdominal pain, nausea and vomiting.   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 Was recently seen for same but hasn't resolved and started worsening. Generalized abdominal pain, nausea, decreased appetite. Took zofran around 1800 yesterday. AVSS on RA.      Abnormal Results:   Labs Ordered and Resulted from Time of ED Arrival to Time of ED Departure   COMPREHENSIVE METABOLIC PANEL - Abnormal       Result Value    Sodium 134 (*)     Potassium 2.4 (*)     Carbon Dioxide (CO2) 23      Anion Gap 17 (*)     Urea Nitrogen 6.4      Creatinine 0.69      GFR Estimate >90      Calcium 9.4      Chloride 94 (*)     Glucose 113 (*)     Alkaline Phosphatase 53      AST 18      ALT 19      Protein Total 7.9      Albumin 4.7      Bilirubin Total 0.6     CBC WITH PLATELETS AND DIFFERENTIAL    WBC Count 5.8      RBC Count 4.94      Hemoglobin 14.8      Hematocrit 41.4      MCV 84      MCH 30.0      MCHC 35.7      RDW 11.8      Platelet Count 158      % Neutrophils " [facility], [place], Emergency Department  [address] [place] [id number]  Phone:  [phone]                                    Ana Leblanc   MRN: [id number]    Department:  [facility], Emergency Department   Date of Visit:  9/23/2020           After Visit Summary Signature Page    I have received my discharge instructions, and my questions have been answered. I have discussed any challenges I see with this plan with the nurse or doctor.    ..........................................................................................................................................  Patient/Patient Representative Signature      ..........................................................................................................................................  Patient Representative Print Name and Relationship to Patient    ..................................................               ................................................  Date                                   Time    ..........................................................................................................................................  Reviewed by Signature/Title    ...................................................              ..............................................  Date                                               Time          22EPIC Rev 08/18        67      % Lymphocytes 26      % Monocytes 7      % Eosinophils 0      % Basophils 0      % Immature Granulocytes 0      NRBCs per 100 WBC 0      Absolute Neutrophils 3.8      Absolute Lymphocytes 1.5      Absolute Monocytes 0.4      Absolute Eosinophils 0.0      Absolute Basophils 0.0      Absolute Immature Granulocytes 0.0      Absolute NRBCs 0.0     MAGNESIUM        CTA Abdomen Pelvis with Contrast    (Results Pending)       Treatments provided: see MAR  Family Comments: family at bedside  OBS brochure/video discussed/provided to patient:  N/A  ED Medications:   Medications   sodium chloride 0.9% BOLUS 1,000 mL (1,000 mLs Intravenous $New Bag 9/12/24 0316)   potassium chloride 10 mEq in 100 mL sterile water infusion (has no administration in time range)   ondansetron (ZOFRAN) injection 4 mg (4 mg Intravenous $Given 9/12/24 0313)   potassium chloride (KLOR-CON) Packet 40 mEq (40 mEq Oral $Given 9/12/24 0402)       Drips infusing:  No  For the majority of the shift this patient was Green.   Interventions performed were N/A.    Sepsis treatment initiated: No    Cares/treatment/interventions/medications to be completed following ED care: see notes    ED Nurse Name: Collin Bethea, RN  4:07 AM

## 2024-09-13 ENCOUNTER — TELEPHONE (OUTPATIENT)
Dept: OPHTHALMOLOGY | Facility: CLINIC | Age: 44
End: 2024-09-13
Payer: COMMERCIAL

## 2024-09-13 NOTE — TELEPHONE ENCOUNTER
Called Ana with carol also speaks english     Made an appointment for 9/20 with Dr. Harsha Perez declined to write the clinic address or billing information     Sent a text invite thru [employer] for consent to communicate via [employer] / text / phone     Discussed     Wait itime     Billing / insurance     Parking / cost     Pt requested I email her the appointment information - I advised Ana I am not able to    Arianna Wilkes Communication Facilitator on 9/13/2024 at 4:07 PM

## 2024-09-13 NOTE — TELEPHONE ENCOUNTER
[facility] Call Center    Phone Message    May a detailed message be left on voicemail: yes     Reason for Call: Symptoms or Concerns     If patient has red-flag symptoms, warm transfer to triage line    Current symptom or concern: Pt is new to [facility]. She states that for quite a while now in her Lt eye when she looks at a person or object, it's just a dark shadow of the person or object. More recently, she's been having Rt eye blurry vision, especially when she first wakes up in the morning, along with headaches. This has been worsening over 3-4 months.    Symptoms have been present for:  Several month(s)    Has patient previously been seen for this? No    By : N/A    Date: N/A    Are there any new or worsening symptoms? Yes: Symptoms are worsening.    Action Taken: Message routed to:  [facility]    Travel Screening: Not Applicable     Date of Service:

## 2024-09-16 ENCOUNTER — OFFICE VISIT (OUTPATIENT)
Dept: OPHTHALMOLOGY | Facility: CLINIC | Age: 44
End: 2024-09-16
Attending: OPHTHALMOLOGY
Payer: COMMERCIAL

## 2024-09-16 DIAGNOSIS — H52.11 MYOPIA OF RIGHT EYE WITH ASTIGMATISM AND PRESBYOPIA: ICD-10-CM

## 2024-09-16 DIAGNOSIS — H52.4 MYOPIA OF RIGHT EYE WITH ASTIGMATISM AND PRESBYOPIA: ICD-10-CM

## 2024-09-16 DIAGNOSIS — Q12.0 CONGENITAL CATARACT OF BOTH EYES: Primary | ICD-10-CM

## 2024-09-16 DIAGNOSIS — H52.201 MYOPIA OF RIGHT EYE WITH ASTIGMATISM AND PRESBYOPIA: ICD-10-CM

## 2024-09-16 PROCEDURE — T1013 SIGN LANG/ORAL INTERPRETER: HCPCS | Mod: U4

## 2024-09-16 PROCEDURE — G0463 HOSPITAL OUTPT CLINIC VISIT: HCPCS | Performed by: OPTOMETRIST

## 2024-09-16 PROCEDURE — 92002 INTRM OPH EXAM NEW PATIENT: CPT | Performed by: OPTOMETRIST

## 2024-09-16 RX ORDER — DIPHENHYDRAMINE HCL 25 MG/1
CAPSULE ORAL
COMMUNITY
Start: 2024-08-04

## 2024-09-16 ASSESSMENT — TONOMETRY
IOP_METHOD: TONOPEN
OD_IOP_MMHG: 10
OS_IOP_MMHG: 14

## 2024-09-16 ASSESSMENT — CUP TO DISC RATIO: OD_RATIO: 0.4

## 2024-09-16 ASSESSMENT — VISUAL ACUITY
METHOD: SNELLEN - LINEAR
OS_SC: 3'/200
OD_PH_SC+: +3
OD_SC+: +1
OD_PH_SC: 20/25
OD_SC: 20/25

## 2024-09-16 ASSESSMENT — EXTERNAL EXAM - RIGHT EYE: OD_EXAM: NORMAL

## 2024-09-16 ASSESSMENT — CONF VISUAL FIELD
OD_INFERIOR_NASAL_RESTRICTION: 0
OD_SUPERIOR_NASAL_RESTRICTION: 0
METHOD: COUNTING FINGERS
OS_INFERIOR_NASAL_RESTRICTION: 3
OD_INFERIOR_TEMPORAL_RESTRICTION: 0
OS_INFERIOR_TEMPORAL_RESTRICTION: 3
OD_SUPERIOR_TEMPORAL_RESTRICTION: 0
OD_NORMAL: 1
OS_SUPERIOR_TEMPORAL_RESTRICTION: 3

## 2024-09-16 ASSESSMENT — SLIT LAMP EXAM - LIDS
COMMENTS: NORMAL
COMMENTS: NORMAL

## 2024-09-16 ASSESSMENT — EXTERNAL EXAM - LEFT EYE: OS_EXAM: NORMAL

## 2024-09-16 NOTE — NURSING NOTE
Chief Complaints and History of Present Illnesses   Patient presents with    Decreased Vision Evaluation     Chief Complaint(s) and History of Present Illness(es)       Decreased Vision Evaluation              Laterality: both eyes              Comments    Ana Leblanc is being seen after calling in triage and recommended to be seen by Dr. Mcleod for evaluation of decreased vision both eyes.  present for exam today.    Patient states longstanding decrease of vision left eye, since childhood, with previous ocular pain and flashes of light, with non currently. New blurry vision right eye for at least a month, with gradual worsening, near and distance vision. No ocular pain. No floaters or flashes of light. No dryness or tearing. No h/o DM. No past ocular surgeries or injuries. Does not take any eyedrops. Does get headaches, will take OTC pain relievers as needed. Last eye exam was about 2012. Does not wear any correction currently.    Shobha Moran on 9/16/2024 at 10:13 AM

## 2024-09-16 NOTE — PROGRESS NOTES
Assessment & Plan       Ana Leblanc is a 43 year old female with the following diagnoses:   1. 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 Myopia of right eye with astigmatism and presbyopia - Right Eye     Pt reports blurred vision in morning   Clears later in right eye   Left eye cataract since young   Would like to see better with left eye        Mature cataract left eye   Full exam for glasses following cataract surgery   Patient disposition:   No follow-ups on file.          Complete documentation of historical and exam elements from today's encounter can be found in the full encounter summary report (not reduplicated in this progress note). I personally obtained the chief complaint(s) and history of present illness.  I confirmed and edited as necessary the review of systems, past medical/surgical history, family history, social history, and examination findings as documented by others; and I examined the patient myself. I personally reviewed the relevant tests, images, and reports as documented above. I formulated and edited as necessary the assessment and plan and discussed the findings and management plan with the patient and family.  Dr. Mark Anthony Mcleod

## 2024-09-16 NOTE — PATIENT INSTRUCTIONS
Pt reports blurred vision in morning   Clears later in right eye   Left eye cataract since young   Would like to see better with left eye        Mature cataract left eye   Full exam for glasses following cataract surgery

## 2024-10-02 DIAGNOSIS — Q12.0 CONGENITAL CATARACT OF BOTH EYES: Primary | ICD-10-CM

## 2024-10-16 ENCOUNTER — OFFICE VISIT (OUTPATIENT)
Dept: OPHTHALMOLOGY | Facility: CLINIC | Age: 44
End: 2024-10-16
Attending: OPHTHALMOLOGY
Payer: COMMERCIAL

## 2024-10-16 DIAGNOSIS — H35.9 MACULOPATHY: Primary | ICD-10-CM

## 2024-10-16 DIAGNOSIS — Q12.0 CONGENITAL CATARACT OF BOTH EYES: ICD-10-CM

## 2024-10-16 PROCEDURE — 99204 OFFICE O/P NEW MOD 45 MIN: CPT | Mod: GC | Performed by: OPHTHALMOLOGY

## 2024-10-16 PROCEDURE — 92134 CPTRZ OPH DX IMG PST SGM RTA: CPT | Performed by: OPHTHALMOLOGY

## 2024-10-16 PROCEDURE — 99207 FUNDUS PHOTOS OU (BOTH EYES): CPT | Mod: 26 | Performed by: OPHTHALMOLOGY

## 2024-10-16 PROCEDURE — 76519 ECHO EXAM OF EYE: CPT | Performed by: OPHTHALMOLOGY

## 2024-10-16 PROCEDURE — 92250 FUNDUS PHOTOGRAPHY W/I&R: CPT | Performed by: OPHTHALMOLOGY

## 2024-10-16 PROCEDURE — 92025 CPTRIZED CORNEAL TOPOGRAPHY: CPT | Performed by: OPHTHALMOLOGY

## 2024-10-16 PROCEDURE — G0463 HOSPITAL OUTPT CLINIC VISIT: HCPCS | Performed by: OPHTHALMOLOGY

## 2024-10-16 ASSESSMENT — SLIT LAMP EXAM - LIDS
COMMENTS: NORMAL
COMMENTS: NORMAL

## 2024-10-16 ASSESSMENT — CONF VISUAL FIELD
OD_INFERIOR_NASAL_RESTRICTION: 0
OD_SUPERIOR_TEMPORAL_RESTRICTION: 0
OS_INFERIOR_TEMPORAL_RESTRICTION: 1
OD_SUPERIOR_NASAL_RESTRICTION: 0
METHOD: COUNTING FINGERS
OD_INFERIOR_TEMPORAL_RESTRICTION: 0
OS_SUPERIOR_TEMPORAL_RESTRICTION: 1
OS_SUPERIOR_NASAL_RESTRICTION: 1
OD_NORMAL: 1
OS_INFERIOR_NASAL_RESTRICTION: 1

## 2024-10-16 ASSESSMENT — VISUAL ACUITY
METHOD: SNELLEN - LINEAR
OD_SC: 20/20
OS_SC: 2' 200E
OD_SC+: -2

## 2024-10-16 ASSESSMENT — TONOMETRY
IOP_METHOD: TONOPEN
OD_IOP_MMHG: 12
OS_IOP_MMHG: 13

## 2024-10-16 ASSESSMENT — EXTERNAL EXAM - LEFT EYE: OS_EXAM: NORMAL

## 2024-10-16 ASSESSMENT — CUP TO DISC RATIO: OD_RATIO: 0.4

## 2024-10-16 ASSESSMENT — EXTERNAL EXAM - RIGHT EYE: OD_EXAM: NORMAL
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
## 2024-10-16 NOTE — NURSING NOTE
Chief Complaints and History of Present Illnesses   Patient presents with    Cataract Evaluation     Chief Complaint(s) and History of Present Illness(es)       Cataract Evaluation              Laterality: both eyes    Pain scale: 0/10              Comments    Ana is here referred by Dr Mcleod for evaluation of cataracts (Left>right). She states no change since seeing Dr Harsha Clinton COT 12:32 PM October 16, 2024

## 2024-10-21 ENCOUNTER — TELEPHONE (OUTPATIENT)
Dept: OPHTHALMOLOGY | Facility: CLINIC | Age: 44
End: 2024-10-21
Payer: COMMERCIAL

## 2024-10-21 NOTE — TELEPHONE ENCOUNTER
Left voicemail for patient via  regarding scheduling surgery with Dr. Kemp.  Provided contact number to discuss.  [phone]    Natalie Mckeon, on 10/21/2024 at 1:47 PM

## 2024-11-05 NOTE — TELEPHONE ENCOUNTER
Phone call to patient to schedule surgery writer did not get an answer but did leave voicemail for patient to call back .      [phone]    Alexis Roberts on 11/5/2024 at 2:30 PM

## 2024-11-14 NOTE — TELEPHONE ENCOUNTER
Sent [employer] to to patient to assist with scheduling. Natalie Mckeon on 11/14/2024 at 4:45 PM

## 2024-11-15 NOTE — TELEPHONE ENCOUNTER
Phone call to patient to schedule surgery writer did not get an answer but did leave voicemail for patient to call back .    [phone]      Alexis Roberts on 11/15/2024 at 11:25 AM

## 2025-01-30 PROBLEM — Q12.0 CONGENITAL CATARACT OF BOTH EYES: Status: ACTIVE | Noted: 2024-10-16

## 2025-04-03 NOTE — TELEPHONE ENCOUNTER
FUTURE VISIT INFORMATION      SURGERY INFORMATION:  Date: 7/15/25  Location: [facility] OR   Surgeon:  Francoise Kemp MD   Anesthesia Type:  General with Block   Procedure: Complex cataract extraction PHACOEMULSIFICATION, CATARACT, WITH INTRAOCULAR LENS IMPLANT, possible conversion to extra-capsular, Trypan Blue  Consult: 10/16/24    RECORDS REQUESTED FROM:       Primary Care Provider: Ky Pena MD -  [facility]     Pertinent Medical History: Siu syndrome     Most recent EKG+ [id number]

## 2025-06-25 ENCOUNTER — PRE VISIT (OUTPATIENT)
Dept: SURGERY | Facility: CLINIC | Age: 45
End: 2025-06-25

## 2025-06-25 ENCOUNTER — ANESTHESIA EVENT (OUTPATIENT)
Dept: SURGERY | Facility: AMBULATORY SURGERY CENTER | Age: 45
End: 2025-06-25
Payer: COMMERCIAL

## 2025-07-03 ENCOUNTER — OFFICE VISIT (OUTPATIENT)
Dept: SURGERY | Facility: CLINIC | Age: 45
End: 2025-07-03
Payer: COMMERCIAL

## 2025-07-03 VITALS
HEIGHT: 67 IN | RESPIRATION RATE: 16 BRPM | BODY MASS INDEX: 33.26 KG/M2 | TEMPERATURE: 98.5 F | OXYGEN SATURATION: 97 % | WEIGHT: 211.9 LBS | DIASTOLIC BLOOD PRESSURE: 81 MMHG | HEART RATE: 73 BPM | SYSTOLIC BLOOD PRESSURE: 115 MMHG

## 2025-07-03 DIAGNOSIS — Q12.0 CONGENITAL CATARACT OF BOTH EYES: ICD-10-CM

## 2025-07-03 DIAGNOSIS — Z01.818 PREOP EXAMINATION: Primary | ICD-10-CM

## 2025-07-03 ASSESSMENT — ENCOUNTER SYMPTOMS: ORTHOPNEA: 0

## 2025-07-03 ASSESSMENT — PAIN SCALES - GENERAL: PAINLEVEL_OUTOF10: NO PAIN (0)

## 2025-07-03 NOTE — H&P
Pre-Operative H & P     CC:  Preoperative exam to assess for increased cardiopulmonary risk while undergoing surgery and anesthesia.    Date of Encounter: 7/3/2025  Primary Care Physician:  No Ref-Primary, Physician     Reason for visit:   Encounter Diagnoses   Name Primary?    Preop examination Yes    Congenital cataract of both eyes        HPI  Ana Leblanc is a 44 year old female who presents for pre-operative H & P in preparation for  Procedure Information       Case: [id number] Date/Time: 07/15/25 0800    Procedure: LEFT EYE COMPLEX CATARACT EXTRACTION PHACOEMULSIFICATION, CATARACT, WITH INTRAOCULAR LENS IMPLANT, possible conversion to extra-capsular, Trypan Blue (Left: Eye)    Anesthesia type: General with Block    Diagnosis: Congenital cataract of both eyes [Q12.0]    Pre-op diagnosis: Congenital cataract of both eyes [Q12.0]    Location: [place] [address] / [facility]    Providers: Francoise Kemp MD            Ana Leblanc is a 44 year old female with obesity, 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  She notes that her vision is quite blurry in both eyes, but worst in the left eye.  She has consulted with Dr. Kemp and the above listed procedure has been recommended for treatment.     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    Malignant neoplasm (H)     colon       Past Surgical History  Past Surgical History:   Procedure Laterality Date    COLECTOMY SUBTOTAL N/A 07/2010    iliorectal anastamosis    LAPAROSCOPIC CHOLECYSTECTOMY N/A 11/17/2018    Procedure: LAPAROSCOPIC CHOLECYSTECTOMY;  Surgeon: Shaen Maldonado MD;   Location: [facility] OR       Prior to Admission Medications  Current Outpatient Medications   Medication Sig Dispense Refill    acetaminophen (TYLENOL) 325 MG tablet Take 2 tablets (650 mg) by mouth every 6 hours as needed for mild pain 30 tablet 0    diphenhydrAMINE (BENADRYL) 25 MG tablet Take 1 tablet (25 mg) by mouth every 8 hours as needed for itching or allergies 20 tablet 0    vitamin C (ASCORBIC ACID) 100 MG tablet Take 100 mg by mouth daily      Vitamin D, Cholecalciferol, 1000 units TABS Take 1,000 Units by mouth daily         Allergies  Allergies   Allergen Reactions    Fish-Derived Products Itching    Avocado Hives    Chicken Allergy Hives    Ibuprofen Itching       Social History  Social History     Socioeconomic History    Marital status:      Spouse name: Not on file    Number of children: 3    Years of education: Not on file    Highest education level: Not on file   Occupational History    Occupation: dietician     Employer: [employer]    Tobacco Use    Smoking status: Never    Smokeless tobacco: Never   Substance and Sexual Activity    Alcohol use: No    Drug use: No    Sexual activity: Not on file   Other Topics Concern    Not on file   Social History Narrative    Not on file     Social Drivers of Health     Financial Resource Strain: Not on file   Food Insecurity: Not on file   Transportation Needs: Not on file   Physical Activity: Not on file   Stress: Not on file   Social Connections: Not on file   Interpersonal Safety: Not on file   Housing Stability: Not on file       Family History  Family History   Problem Relation Age of Onset    Glaucoma No family hx of     Macular Degeneration No family hx of     Anesthesia Reaction No family hx of     Thrombosis No family hx of        Review of Systems  The complete review of systems is negative other than noted in the HPI or here.   Anesthesia Evaluation   Pt has had prior anesthetic.     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  Uses the treadmill occasionally.  Can walk several blocks at a time.    Denies any exertional dyspnea or angina.      Hematologic:  - neg hematologic  ROS     Musculoskeletal:  - neg musculoskeletal ROS     GI/Hepatic: Comment: Siu syndrome    History of colon cancer      Renal/Genitourinary:  - neg Renal ROS     Endo:  - neg endo ROS     Psychiatric/Substance Use:  - neg psychiatric ROS     Infectious Disease:  - neg infectious disease ROS     Malignancy:   (+) Malignancy, History of GI.GI CA  Remission status post Surgery.      Other: Comment: Left eye cataract    (-) Any chance pregnant       /81 (BP Location: Right arm, Patient Position: Sitting, Cuff Size: Adult Regular)   Pulse 73   Temp 98.5  F (36.9  C) (Oral)   Resp 16   Ht 1.702 m (5' 7\")   Wt 96.1 kg (211 lb 14.4 oz)   LMP 06/01/2025 (Approximate)   SpO2 97%   Breastfeeding No   BMI 33.19 kg/m      Physical Exam   Constitutional: Awake, alert, cooperative, no apparent distress, and appears stated age.  Eyes: Pupils equal, round and reactive to light, extra ocular muscles intact, sclera clear, conjunctiva normal.  HENT: Normocephalic, oral pharynx with moist mucus membranes, good dentition. No goiter appreciated.   Respiratory: Clear to auscultation bilaterally, no crackles or wheezing.  Cardiovascular: Regular rate and rhythm, normal S1 and S2, and no murmur noted.  Carotids +2, no bruits. No edema. Palpable pulses to radial  DP and PT arteries.   GI: Normal bowel sounds, soft, " non-distended, non-tender, no masses palpated, no hepatosplenomegaly.    Lymph/Hematologic: No cervical lymphadenopathy and no supraclavicular lymphadenopathy.  Genitourinary:  deferred  Skin: Warm and dry.    Musculoskeletal: Full ROM of neck. There is no redness, warmth, or swelling of the exposed joints. Gross motor strength is normal.    Neurologic: Awake, alert, oriented to name, place and time. Cranial nerves II-XII are grossly intact. Gait is normal.   Neuropsychiatric: Calm, cooperative. Normal affect.     Prior Labs/Diagnostic Studies   All labs and imaging pertinent to the visit personally reviewed     EKG/ stress test - if available please see in ROS above       The patient's records and results pertinent to the visit personally reviewed by this provider.     Outside records reviewed from: Care Everywhere    LAB/DIAGNOSTIC STUDIES TODAY:  none    Assessment    Ana Leblanc is a 44 year old female seen as a PAC referral for risk assessment and optimization for anesthesia.    Plan/Recommendations  Pt will be optimized for the proposed procedure.  See below for details on the assessment, risk, and preoperative recommendations    NEUROLOGY  - No history of TIA, CVA or seizure    -Post Op delirium risk factors:  No risk identified    ENT  - No current airway concerns.  Will need to be reassessed day of surgery.  Mallampati: I  TM: > 3    CARDIAC  - No history of CAD, Hypertension, and Afib  - METS (Metabolic Equivalents)  Patient performs 4 or more METS exercise without symptoms             Total Score: 0      RCRI-Very low risk: Class 1 0.4% complication rate             Total Score: 0        PULMONARY    JAMAAL Low Risk             Total Score: 0      - Denies asthma or inhaler use  - Tobacco History    History   Smoking Status    Never   Smokeless Tobacco    Never       GI  - worthy syndrome with history of colon cancer.  Following at [facility].  Reports that she has yearly endoscopy procedures for surveillance.  "    - denies GERD    PONV Medium Risk  Total Score: 2           1 AN PONV: Pt is Female    1 AN PONV: Patient is not a current smoker            ENDOCRINE    - BMI: Estimated body mass index is 33.19 kg/m  as calculated from the following:    Height as of this encounter: 1.702 m (5' 7\").    Weight as of this encounter: 96.1 kg (211 lb 14.4 oz).  Obesity (BMI >30)  - No history of Diabetes Mellitus    HEME  VTE Low Risk 0.26%             Total Score: 0      - No history of abnormal bleeding or antiplatelet use.      EYE  - bilateral cataracts.  Surgery planned as above.      Different anesthesia methods/types have been discussed with the patient, but they are aware that the final plan will be decided by the assigned anesthesia provider on the date of service.      The patient is optimized for their procedure. AVS with information on surgery time/arrival time, meds and NPO status given by nursing staff. No further diagnostic testing indicated.        22 minutes were spent on the date of the encounter performing chart review, history and exam, documentation and/or discussion with other providers about the issues documented above.    TAMAR Perry CNP  Preoperative Assessment Center  [facility]  Clinic and Surgery Center  Phone: [phone]  Fax: [phone]    "

## 2025-07-03 NOTE — PATIENT INSTRUCTIONS
Preparing for Your Surgery      Name:  Ana Leblanc   MRN:  [id number]   :  1980   Today's Date:  7/3/2025       [employer] [place] [employer] [address] Construction Project                                Timeline 2025 -2025    This project will affect travel to the [facility], as well as the [facility] and Surgery Center.    Please check the [employer] [address] project website for the most up to date information and give yourself additional time to reach your destination.    Arriving for surgery:  Surgery date:  7/15/25, Tuesday  Arrival time:  6.30AM    Restrictions due to COVID 19:    Please maintain social distance.  Masking is optional.      parking is available for anyone with mobility limitations or disabilities. (Monday- Friday 7 am- 5 pm)    Please come to:    [facility]  [address] [place] [phone]    Please check in on the 5th floor at the Ambulatory Surgery Center.      What can I eat or drink?    -  You may eat and drink normally until 8 hours prior to arrival  time. (Until 10.30PM)  -  You may have clear liquids until 2 hours prior to arrival  time. (Until 4.30AM)    Examples of clear liquids:  Water  Clear broth  Juices (apple, white grape, white cranberry  and cider) without pulp  Noncarbonated, powder based beverages  (lemonade and Dante-Aid)  Sodas (Sprite, 7-Up, ginger ale and seltzer)  Coffee or tea (without milk or cream)  Gatorade      Which medicines can I take?    Hold Aspirin for 7 days before surgery.   Hold Multivitamins for 7 days before surgery.  Hold Supplements for 7 days before surgery.  Hold Ibuprofen (Advil, Motrin) for 1 day before surgery--unless otherwise directed by surgeon.  Hold Naproxen (Aleve) for 4 days before surgery.    No alcohol or cannabis products for 24 hours prior to procedure.      -  DO NOT take the following medications the day of surgery:  Vitamin C,  D  Diphenhydramine (Benadryl)    -  PLEASE TAKE the following medications the day of surgery:   Tylenol as needed      How do I prepare myself?  - Please take 2 showers (one the night prior to surgery and one the morning of surgery) using Scrubcare or Hibiclens soap.    Use this soap only from the neck to your toes. Avoid genital area      Leave the soap on your skin for one minute--then rinse thoroughly.      You may use your own shampoo and conditioner. No other hair products.   - Please remove all jewelry and body piercings.  - No lotions, deodorants or fragrance.  - No makeup or fingernail polish.   - Bring your ID and insurance card.    -If you have a Deep Brain Stimulator, a Spinal Cord Stimulator, or any implanted Neuro Device, you must bring the remote to the Surgery Center.         ALL PATIENTS ARE REQUIRED TO HAVE A RESPONSIBLE ADULT TO DRIVE AND BE IN ATTENDANCE WITH THEM FOR 24 HOURS FOLLOWING SURGERY.     Covid testing policy as of 12/06/2022  Your surgeon will notify and schedule you for a COVID test if one is needed before surgery--please direct any questions or COVID symptoms to your surgeon      Questions or Concerns:    -For questions regarding the day of surgery, please contact the Ambulatory Surgery Center at [phone].    -If you have health changes between today and your surgery, please contact your surgeon.     - For questions after surgery, please contact your surgeon's office.

## 2025-07-15 ENCOUNTER — ANESTHESIA (OUTPATIENT)
Dept: SURGERY | Facility: AMBULATORY SURGERY CENTER | Age: 45
End: 2025-07-15
Payer: COMMERCIAL

## 2025-07-15 ENCOUNTER — HOSPITAL ENCOUNTER (OUTPATIENT)
Facility: AMBULATORY SURGERY CENTER | Age: 45
Discharge: HOME | End: 2025-07-15
Attending: OPHTHALMOLOGY
Payer: COMMERCIAL

## 2025-07-15 VITALS
RESPIRATION RATE: 16 BRPM | BODY MASS INDEX: 33.12 KG/M2 | HEART RATE: 63 BPM | HEIGHT: 67 IN | OXYGEN SATURATION: 100 % | SYSTOLIC BLOOD PRESSURE: 106 MMHG | TEMPERATURE: 97 F | DIASTOLIC BLOOD PRESSURE: 85 MMHG | WEIGHT: 211 LBS

## 2025-07-15 DIAGNOSIS — Q12.0 CONGENITAL CATARACT OF BOTH EYES: Primary | ICD-10-CM

## 2025-07-15 LAB
HCG UR QL: NEGATIVE
INTERNAL QC OK POCT: NORMAL
POCT KIT EXPIRATION DATE: NORMAL
POCT KIT LOT NUMBER: NORMAL

## 2025-07-15 DEVICE — STERILE UV ABSORBING HYDROPHOBIC ACRYLIC FOLDABLE ASPHERIC POSTERIOR CHAMBER INTRAOCULAR LENS WITH THE AUTONOME™ AUTOMATED PRE-LOADED DELIVERY SYSTEM
Type: IMPLANTABLE DEVICE | Site: EYE | Status: FUNCTIONAL
Brand: CLAREON™

## 2025-07-15 RX ORDER — FENTANYL CITRATE 50 UG/ML
INJECTION, SOLUTION INTRAMUSCULAR; INTRAVENOUS PRN
Status: DISCONTINUED | OUTPATIENT
Start: 2025-07-15 | End: 2025-07-15

## 2025-07-15 RX ORDER — NALOXONE HYDROCHLORIDE 0.4 MG/ML
0.1 INJECTION, SOLUTION INTRAMUSCULAR; INTRAVENOUS; SUBCUTANEOUS
Status: DISCONTINUED | OUTPATIENT
Start: 2025-07-15 | End: 2025-07-16 | Stop reason: HOSPADM

## 2025-07-15 RX ORDER — LIDOCAINE 40 MG/G
CREAM TOPICAL
Status: DISCONTINUED | OUTPATIENT
Start: 2025-07-15 | End: 2025-07-15 | Stop reason: HOSPADM

## 2025-07-15 RX ORDER — LIDOCAINE HYDROCHLORIDE 20 MG/ML
INJECTION, SOLUTION INFILTRATION; PERINEURAL PRN
Status: DISCONTINUED | OUTPATIENT
Start: 2025-07-15 | End: 2025-07-15

## 2025-07-15 RX ORDER — ONDANSETRON 4 MG/1
4 TABLET, ORALLY DISINTEGRATING ORAL EVERY 30 MIN PRN
Status: DISCONTINUED | OUTPATIENT
Start: 2025-07-15 | End: 2025-07-16 | Stop reason: HOSPADM

## 2025-07-15 RX ORDER — DEXAMETHASONE SODIUM PHOSPHATE 4 MG/ML
INJECTION, SOLUTION INTRA-ARTICULAR; INTRALESIONAL; INTRAMUSCULAR; INTRAVENOUS; SOFT TISSUE PRN
Status: DISCONTINUED | OUTPATIENT
Start: 2025-07-15 | End: 2025-07-15

## 2025-07-15 RX ORDER — TETRACAINE HYDROCHLORIDE 5 MG/ML
SOLUTION OPHTHALMIC PRN
Status: DISCONTINUED | OUTPATIENT
Start: 2025-07-15 | End: 2025-07-15 | Stop reason: HOSPADM

## 2025-07-15 RX ORDER — HYDROMORPHONE HYDROCHLORIDE 1 MG/ML
0.2 INJECTION, SOLUTION INTRAMUSCULAR; INTRAVENOUS; SUBCUTANEOUS EVERY 5 MIN PRN
Status: DISCONTINUED | OUTPATIENT
Start: 2025-07-15 | End: 2025-07-16 | Stop reason: HOSPADM

## 2025-07-15 RX ORDER — SODIUM CHLORIDE, SODIUM LACTATE, POTASSIUM CHLORIDE, CALCIUM CHLORIDE 600; 310; 30; 20 MG/100ML; MG/100ML; MG/100ML; MG/100ML
INJECTION, SOLUTION INTRAVENOUS CONTINUOUS
Status: DISCONTINUED | OUTPATIENT
Start: 2025-07-15 | End: 2025-07-16 | Stop reason: HOSPADM

## 2025-07-15 RX ORDER — CYCLOPENTOLAT/TROPIC/PHENYLEPH 1%-1%-2.5%
1 DROPS (EA) OPHTHALMIC (EYE)
Status: COMPLETED | OUTPATIENT
Start: 2025-07-15 | End: 2025-07-15

## 2025-07-15 RX ORDER — PROPARACAINE HYDROCHLORIDE 5 MG/ML
1 SOLUTION/ DROPS OPHTHALMIC ONCE
Status: COMPLETED | OUTPATIENT
Start: 2025-07-15 | End: 2025-07-15

## 2025-07-15 RX ORDER — MEPERIDINE HYDROCHLORIDE 25 MG/ML
12.5 INJECTION INTRAMUSCULAR; INTRAVENOUS; SUBCUTANEOUS EVERY 5 MIN PRN
Status: DISCONTINUED | OUTPATIENT
Start: 2025-07-15 | End: 2025-07-16 | Stop reason: HOSPADM

## 2025-07-15 RX ORDER — MOXIFLOXACIN 5 MG/ML
1 SOLUTION/ DROPS OPHTHALMIC 4 TIMES DAILY
Qty: 3 ML | Refills: 0 | Status: SHIPPED | OUTPATIENT
Start: 2025-07-15

## 2025-07-15 RX ORDER — PREDNISOLONE ACETATE 10 MG/ML
1 SUSPENSION/ DROPS OPHTHALMIC 4 TIMES DAILY
Qty: 5 ML | Refills: 0 | Status: SHIPPED | OUTPATIENT
Start: 2025-07-15

## 2025-07-15 RX ORDER — PROPOFOL 10 MG/ML
INJECTION, EMULSION INTRAVENOUS PRN
Status: DISCONTINUED | OUTPATIENT
Start: 2025-07-15 | End: 2025-07-15

## 2025-07-15 RX ORDER — FENTANYL CITRATE 50 UG/ML
25 INJECTION, SOLUTION INTRAMUSCULAR; INTRAVENOUS
Status: DISCONTINUED | OUTPATIENT
Start: 2025-07-15 | End: 2025-07-16 | Stop reason: HOSPADM

## 2025-07-15 RX ORDER — OXYCODONE HYDROCHLORIDE 5 MG/1
5 TABLET ORAL
Status: COMPLETED | OUTPATIENT
Start: 2025-07-15 | End: 2025-07-15

## 2025-07-15 RX ORDER — LABETALOL HYDROCHLORIDE 5 MG/ML
10 INJECTION, SOLUTION INTRAVENOUS
Status: DISCONTINUED | OUTPATIENT
Start: 2025-07-15 | End: 2025-07-16 | Stop reason: HOSPADM

## 2025-07-15 RX ORDER — ONDANSETRON 2 MG/ML
4 INJECTION INTRAMUSCULAR; INTRAVENOUS EVERY 30 MIN PRN
Status: DISCONTINUED | OUTPATIENT
Start: 2025-07-15 | End: 2025-07-16 | Stop reason: HOSPADM

## 2025-07-15 RX ORDER — HYDROMORPHONE HYDROCHLORIDE 1 MG/ML
0.4 INJECTION, SOLUTION INTRAMUSCULAR; INTRAVENOUS; SUBCUTANEOUS EVERY 5 MIN PRN
Status: DISCONTINUED | OUTPATIENT
Start: 2025-07-15 | End: 2025-07-16 | Stop reason: HOSPADM

## 2025-07-15 RX ORDER — FENTANYL CITRATE 50 UG/ML
50 INJECTION, SOLUTION INTRAMUSCULAR; INTRAVENOUS EVERY 5 MIN PRN
Status: DISCONTINUED | OUTPATIENT
Start: 2025-07-15 | End: 2025-07-16 | Stop reason: HOSPADM

## 2025-07-15 RX ORDER — BALANCED SALT SOLUTION 6.4; .75; .48; .3; 3.9; 1.7 MG/ML; MG/ML; MG/ML; MG/ML; MG/ML; MG/ML
SOLUTION OPHTHALMIC PRN
Status: DISCONTINUED | OUTPATIENT
Start: 2025-07-15 | End: 2025-07-15 | Stop reason: HOSPADM

## 2025-07-15 RX ORDER — ACETAMINOPHEN 325 MG/1
975 TABLET ORAL ONCE
Status: COMPLETED | OUTPATIENT
Start: 2025-07-15 | End: 2025-07-15

## 2025-07-15 RX ORDER — PROPOFOL 10 MG/ML
INJECTION, EMULSION INTRAVENOUS CONTINUOUS PRN
Status: DISCONTINUED | OUTPATIENT
Start: 2025-07-15 | End: 2025-07-15

## 2025-07-15 RX ORDER — DEXAMETHASONE SODIUM PHOSPHATE 10 MG/ML
4 INJECTION, SOLUTION INTRAMUSCULAR; INTRAVENOUS
Status: DISCONTINUED | OUTPATIENT
Start: 2025-07-15 | End: 2025-07-16 | Stop reason: HOSPADM

## 2025-07-15 RX ORDER — FENTANYL CITRATE 50 UG/ML
25 INJECTION, SOLUTION INTRAMUSCULAR; INTRAVENOUS EVERY 5 MIN PRN
Status: DISCONTINUED | OUTPATIENT
Start: 2025-07-15 | End: 2025-07-16 | Stop reason: HOSPADM

## 2025-07-15 RX ORDER — DEXAMETHASONE SODIUM PHOSPHATE 4 MG/ML
INJECTION, SOLUTION INTRA-ARTICULAR; INTRALESIONAL; INTRAMUSCULAR; INTRAVENOUS; SOFT TISSUE PRN
Status: DISCONTINUED | OUTPATIENT
Start: 2025-07-15 | End: 2025-07-15 | Stop reason: HOSPADM

## 2025-07-15 RX ORDER — OXYCODONE HYDROCHLORIDE 5 MG/1
10 TABLET ORAL
Status: DISCONTINUED | OUTPATIENT
Start: 2025-07-15 | End: 2025-07-16 | Stop reason: HOSPADM

## 2025-07-15 RX ORDER — SODIUM CHLORIDE, SODIUM LACTATE, POTASSIUM CHLORIDE, CALCIUM CHLORIDE 600; 310; 30; 20 MG/100ML; MG/100ML; MG/100ML; MG/100ML
INJECTION, SOLUTION INTRAVENOUS CONTINUOUS
Status: DISCONTINUED | OUTPATIENT
Start: 2025-07-15 | End: 2025-07-15 | Stop reason: HOSPADM

## 2025-07-15 RX ORDER — ONDANSETRON 2 MG/ML
INJECTION INTRAMUSCULAR; INTRAVENOUS PRN
Status: DISCONTINUED | OUTPATIENT
Start: 2025-07-15 | End: 2025-07-15

## 2025-07-15 RX ORDER — EPHEDRINE SULFATE 50 MG/ML
INJECTION, SOLUTION INTRAMUSCULAR; INTRAVENOUS; SUBCUTANEOUS PRN
Status: DISCONTINUED | OUTPATIENT
Start: 2025-07-15 | End: 2025-07-15

## 2025-07-15 RX ADMIN — LIDOCAINE HYDROCHLORIDE 100 MG: 20 INJECTION, SOLUTION INFILTRATION; PERINEURAL at 07:59

## 2025-07-15 RX ADMIN — Medication 1 DROP: at 07:13

## 2025-07-15 RX ADMIN — ONDANSETRON 4 MG: 2 INJECTION INTRAMUSCULAR; INTRAVENOUS at 08:24

## 2025-07-15 RX ADMIN — ACETAMINOPHEN 975 MG: 325 TABLET ORAL at 07:00

## 2025-07-15 RX ADMIN — FENTANYL CITRATE 25 MCG: 50 INJECTION, SOLUTION INTRAMUSCULAR; INTRAVENOUS at 09:00

## 2025-07-15 RX ADMIN — OXYCODONE HYDROCHLORIDE 5 MG: 5 TABLET ORAL at 09:02

## 2025-07-15 RX ADMIN — EPHEDRINE SULFATE 5 MG: 50 INJECTION, SOLUTION INTRAMUSCULAR; INTRAVENOUS; SUBCUTANEOUS at 08:28

## 2025-07-15 RX ADMIN — Medication 1 DROP: at 07:10

## 2025-07-15 RX ADMIN — DEXAMETHASONE SODIUM PHOSPHATE 4 MG: 4 INJECTION, SOLUTION INTRA-ARTICULAR; INTRALESIONAL; INTRAMUSCULAR; INTRAVENOUS; SOFT TISSUE at 08:02

## 2025-07-15 RX ADMIN — EPHEDRINE SULFATE 5 MG: 50 INJECTION, SOLUTION INTRAMUSCULAR; INTRAVENOUS; SUBCUTANEOUS at 08:25

## 2025-07-15 RX ADMIN — PROPOFOL 200 MG: 10 INJECTION, EMULSION INTRAVENOUS at 07:59

## 2025-07-15 RX ADMIN — SODIUM CHLORIDE, SODIUM LACTATE, POTASSIUM CHLORIDE, CALCIUM CHLORIDE: 600; 310; 30; 20 INJECTION, SOLUTION INTRAVENOUS at 07:53

## 2025-07-15 RX ADMIN — Medication 1 DROP: at 07:06

## 2025-07-15 RX ADMIN — PROPOFOL 150 MCG/KG/MIN: 10 INJECTION, EMULSION INTRAVENOUS at 07:59

## 2025-07-15 RX ADMIN — FENTANYL CITRATE 50 MCG: 50 INJECTION, SOLUTION INTRAMUSCULAR; INTRAVENOUS at 07:53

## 2025-07-15 RX ADMIN — PROPARACAINE HYDROCHLORIDE 1 DROP: 5 SOLUTION/ DROPS OPHTHALMIC at 07:03

## 2025-07-15 NOTE — DISCHARGE INSTRUCTIONS
Tylenol:  Next dose at 1:00 PM.  Take 975mg-1000mg every 6 hours for the first 24 hours.  Then take as needed.    [facility]  Home Care Following Anesthesia  For 24 hours after surgery:  Get plenty of rest.  A responsible adult must stay with you for at least 24 hours after you leave the surgery center.  Do not drive or use heavy equipment.  If you have weakness or tingling, don't drive or use heavy equipment until this feeling goes away.   Do not drink alcohol.   Avoid strenuous or risky activities.  Ask for help when climbing stairs.  You may feel lightheaded.  IF so, sit for a few minutes before standing.  Have someone help you get up.   If you have nausea (feel sick to your stomach): Drink only clear liquids such as apple juice, ginger ale, broth or 7-Up.  Rest may also help.  Be sure to drink enough fluids.  Move to a regular diet as you feel able.   You may have a slight fever.  Call the doctor if your fever is over 100 F (37.7 C) (taken under the tongue) or lasts longer than 24 hours.  You may have a dry mouth, a sore throat, muscle aches or trouble sleeping. These should go away after 24 hours.  Do not make important or legal decisions.   It is recommended to avoid smoking.               Tips for taking pain medications  To get the best pain relief possible, remember these points:  Take pain medications as directed, before pain becomes severe.  Pain medication can upset your stomach: taking it with food may help.  Constipation is a common side effect of pain medication. Drink plenty of  fluids.  Eat foods high in fiber. Take a stool softener if recommended by your doctor or pharmacist.  Do not drink alcohol, drive or operate machinery while taking pain medications.  Ask about other ways to control pain, such as with heat, ice or relaxation.    Tylenol/Acetaminophen Consumption    If you feel your pain relief is insufficient, you may take Tylenol/Acetaminophen in  addition to your narcotic pain medication.   Be careful not to exceed 4,000 mg of Tylenol/Acetaminophen in a 24 hour period from all sources.  If you are taking extra strength Tylenol/acetaminophen (500 mg), the maximum dose is 8 tablets in 24 hours.  If you are taking regular strength acetaminophen (325 mg), the maximum dose is 12 tablets in 24 hours.    Call a doctor for any of the following:  Signs of infection (fever, growing tenderness at the surgery site, a large amount of drainage or bleeding, severe pain, foul-smelling drainage, redness, swelling).  It has been over 8 to 10 hours since surgery and you are still not able to urinate (pass water).  Headache for over 24 hours.  Numbness, tingling or weakness the day after surgery (if you had spinal anesthesia).  Signs of Covid-19 infection (temperature over 100 degrees, shortness of breath, cough, loss of taste/smell, generalized body aches, persistent headache, chills, sore throat, nausea/vomiting/diarrhea)    Your doctor is:       Dr. Francoise Kemp, Ophthalmology: [phone]               After hours and weekends call the hospital @ [phone] and ask for the resident on call for:  Ophthalmology  For emergency care, call the:  [facility]:  [phone] (TTY for hearing impaired: [phone])

## 2025-07-15 NOTE — ANESTHESIA POSTPROCEDURE EVALUATION
Patient: Ana Leblanc    Procedure: Procedure(s):  LEFT EYE COMPLEX CATARACT EXTRACTION PHACOEMULSIFICATION, CATARACT, WITH INTRAOCULAR LENS IMPLANT, Trypan Blue       Anesthesia Type:  General    Note:  Disposition: Outpatient   Postop Pain Control: Uneventful            Sign Out: Well controlled pain   PONV: No   Neuro/Psych: Uneventful            Sign Out: Acceptable/Baseline neuro status   Airway/Respiratory: Uneventful            Sign Out: Acceptable/Baseline resp. status   CV/Hemodynamics: Uneventful            Sign Out: Acceptable CV status   Other NRE: NONE   DID A NON-ROUTINE EVENT OCCUR? No           Last vitals:  Vitals Value Taken Time   /82 07/15/25 09:15   Temp 36  C (96.8  F) 07/15/25 09:15   Pulse 63 07/15/25 09:15   Resp 11 07/15/25 09:15   SpO2 100 % 07/15/25 09:15       Electronically Signed By: Santo Austin MD  July 15, 2025  12:47 PM

## 2025-07-15 NOTE — ANESTHESIA CARE TRANSFER NOTE
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    Anesthesia Type:   General     Note:    Oropharynx: oropharynx clear of all foreign objects and spontaneously breathing  Level of Consciousness: awake  Oxygen Supplementation: nasal cannula  Level of Supplemental Oxygen (L/min / FiO2): 3  Independent Airway: airway patency satisfactory and stable  Dentition: dentition unchanged  Vital Signs Stable: post-procedure vital signs reviewed and stable  Report to RN Given: handoff report given  Patient transferred to: Phase II    Handoff Report: Identifed the Patient, Identified the Reponsible Provider, Reviewed the pertinent medical history, Discussed the surgical course, Reviewed Intra-OP anesthesia mangement and issues during anesthesia, Set expectations for post-procedure period and Allowed opportunity for questions and acknowledgement of understanding      Vitals:  Vitals Value Taken Time   BP     Temp     Pulse     Resp     SpO2         Electronically Signed By: TAMAR Samuels CRNA  July 15, 2025  8:53 AM

## 2025-07-15 NOTE — ANESTHESIA PROCEDURE NOTES
Airway       Patient location during procedure: OR       Procedure Start/Stop Times: 7/15/2025 8:00 AM  Staff -        Performed By: anesthesiologistIndications and Patient Condition       Indications for airway management: kimberley-procedural and airway protection       Induction type:intravenous       Mask difficulty assessment: 1 - vent by mask    Final Airway Details       Final airway type: supraglottic airway    Supraglottic Airway Details        Type: LMA       Brand: I-Gel       LMA size: 4    Post intubation assessment        Placement verified by: capnometry, equal breath sounds and chest rise        Number of attempts at approach: 1       Number of other approaches attempted: 0       Secured with: tape       Ease of procedure: easy       Dentition: Unchanged    Medication(s) Administered   Medication Administration Time: 7/15/2025 8:00 AM

## 2025-07-15 NOTE — ANESTHESIA PREPROCEDURE EVALUATION
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    Malignant neoplasm (H)     colon      Past Surgical History:   Procedure Laterality Date    COLECTOMY SUBTOTAL N/A 2010    iliorectal anastamosis    LAPAROSCOPIC CHOLECYSTECTOMY N/A 2018    Procedure: LAPAROSCOPIC CHOLECYSTECTOMY;  Surgeon: Shane Maldonado MD;  Location: [facility] OR      Allergies   Allergen Reactions    Avocado Hives    Chicken Allergy Hives    Ibuprofen Itching      Social History     Tobacco Use    Smoking status: Never    Smokeless tobacco: Never   Substance Use Topics    Alcohol use: No      Wt Readings from Last 1 Encounters:   07/15/25 95.7 kg (211 lb)        Anesthesia Evaluation            ROS/MED HX  ENT/Pulmonary:  - neg pulmonary ROS     Neurologic:  - neg neurologic ROS     Cardiovascular:  - neg cardiovascular ROS     METS/Exercise Tolerance:     Hematologic:  - neg hematologic  ROS     Musculoskeletal:  - neg musculoskeletal ROS     GI/Hepatic:  - neg GI/hepatic ROS     Renal/Genitourinary:  - neg Renal ROS     Endo:  - neg endo ROS     Psychiatric/Substance Use:  - neg psychiatric ROS     Infectious Disease:  - neg infectious disease ROS     Malignancy:  - neg malignancy ROS     Other:  - neg other ROS            Physical Exam  Airway  Mallampati: II  TM distance: >3 FB  Neck ROM: full  Mouth opening: >= 4 cm    Cardiovascular   Rhythm: regular  Rate: normal rate     Dental   (+) Completely normal teeth      Pulmonary Breath sounds clear to auscultation        Neurological   She appears awake, alert and oriented x3.    Other Findings       OUTSIDE LABS:  CBC:   Lab Results  "  Component Value Date    WBC 7.6 11/18/2018    WBC 5.9 11/17/2018    HGB 11.2 (L) 11/18/2018    HGB 11.6 (L) 11/17/2018    HCT 34.8 (L) 11/18/2018    HCT 36.0 11/17/2018     11/18/2018     11/17/2018     BMP:   Lab Results   Component Value Date     11/18/2018     11/17/2018    POTASSIUM 3.6 11/18/2018    POTASSIUM 3.8 11/17/2018    CHLORIDE 107 11/18/2018    CHLORIDE 107 11/17/2018    CO2 24 11/18/2018    CO2 24 11/17/2018    BUN 6 (L) 11/18/2018    BUN 6 (L) 11/17/2018    CR 0.57 11/18/2018    CR 0.56 11/17/2018    GLC 89 11/18/2018    GLC 78 11/17/2018     COAGS: No results found for: \"PTT\", \"INR\", \"FIBR\"  POC:   Lab Results   Component Value Date    BGM 72 11/17/2018    HCG Negative 07/15/2025     HEPATIC:   Lab Results   Component Value Date    ALBUMIN 2.9 (L) 11/17/2018    PROTTOTAL 7.0 11/17/2018    ALT 19 11/17/2018    AST 15 11/17/2018    ALKPHOS 24 (L) 11/17/2018    BILITOTAL 0.4 11/17/2018     OTHER:   Lab Results   Component Value Date    LACT 1.4 11/16/2018    LEXII 8.5 11/18/2018    LIPASE 74 11/16/2018       Anesthesia Plan    ASA Status:  1      NPO Status: NPO Appropriate   Anesthesia Type: General.  Airway: oral.  Induction: intravenous.  Maintenance: TIVA.   Techniques and Equipment:       - Monitoring Plan: standard ASA monitoring     Consents    Anesthesia Plan(s) and associated risks, benefits, and realistic alternatives discussed. Questions answered and patient/representative(s) expressed understanding.     - Discussed:     - Discussed with:  Patient        - Pt is DNR/DNI Status: no DNR     Blood Consent:      - Discussed with: not discussed.     Postoperative Care    Pain management: non-narcotic analgesics, plan for postoperative opioid use.     Comments:                   Santo Austin MD    I have reviewed the pertinent notes and labs in the chart from the past 30 days and (re)examined the patient.  Any updates or changes from those notes are reflected in " "this note.    Clinically Significant Risk Factors Present on Admission                             # Obesity: Estimated body mass index is 33.05 kg/m  as calculated from the following:    Height as of this encounter: 1.702 m (5' 7\").    Weight as of this encounter: 95.7 kg (211 lb).                    "

## 2025-07-15 NOTE — OP NOTE
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   POSTOPERATIVE DIAGNOSIS: same  NAME OF THE PROCEDURE: complex Phacoemulsification with intraocular lens implantation left eye   Dense congenital cataract   ANESTHESIA: general anesthesia   COMPLICATIONS: none  INDICATIONS: Ana Leblanc is a 44 year old with diagnosis of visually significant cataract, here for cataract surgery    DESCRIPTION OF THE PROCEDURE:  The patient was taken to the operative room where sedation was administered by the anesthesia department and a peribulbar block consisting of a 1:1 mixture of 2%lidocaine and 0.75% marcaine with epinephrine and wydase, was administered to the operative eye with adequate anesthesia and akinesia.    The operative eye was prepped and draped in the usual sterile surgical fashion for ophthalmic surgery, including the installation of one drop of 5% Povidone Iodine.  A sterile drape was placed over the face and body and a lid speculum was inserted.      With the use of a Supersharp blade and 0.12 forceps, a paracentesis was created at the 2 o'clock position, and trypan blue was injected, this was irrigated with epishugarcaine, next, viscoelastic was injected into the anterior chamber using a canula.  A 2.5 mm keratome was then used to construct a clear corneal incision at the 10 o'clock position.  Using Utrata forceps and cystotome needle, a continuous curvilinear capsulorrhexis was created and hydrodissection was undertaken with the use of BSS.  The nucleus was found to be freely mobile and then removed by phacoemulsification using a divide and conquer technique in a dense mode.  The remaining elements of cortex were then removed with irrigation/aspiration.  An IOL,was injected into the capsular bag and was rotated into a good position with a Sinskey hook. The remaining elements of viscoelastic were then removed with  irrigation/aspiration. The wounds were hydrodissect and were watertight.    intraocular moxifloxacin and Subconjunctival injection of Dexamethasone were administered.   The lid speculum was removed.  The eye was cleaned with wet and dry gauze. Maxitrol ointment was placed on the eye.  A patch and Baker shield were placed over the eye.  The patient was discharge in stable condition having tolerated the procedure well    I was present for the entire case.    Implant Name Type Inv. Item Serial No.  Lot No. LRB No. Used Action   LENS [id number] 24.0 [employer] IOL - [id number] Lens/Eye Implant LENS [id number] 24.0 [employer] IOL [id number] [employer]  Left 1 Implanted

## 2025-07-16 ENCOUNTER — OFFICE VISIT (OUTPATIENT)
Dept: OPHTHALMOLOGY | Facility: CLINIC | Age: 45
End: 2025-07-16
Attending: OPHTHALMOLOGY
Payer: COMMERCIAL

## 2025-07-16 DIAGNOSIS — Z98.890 POSTOPERATIVE EYE STATE: Primary | ICD-10-CM

## 2025-07-16 PROCEDURE — G0463 HOSPITAL OUTPT CLINIC VISIT: HCPCS | Performed by: OPHTHALMOLOGY

## 2025-07-16 ASSESSMENT — VISUAL ACUITY
METHOD: SNELLEN - LINEAR
OD_SC: 20/20
OS_SC: 3'/200 E CARD

## 2025-07-16 ASSESSMENT — SLIT LAMP EXAM - LIDS
COMMENTS: NORMAL
COMMENTS: NORMAL

## 2025-07-16 ASSESSMENT — TONOMETRY
OD_IOP_MMHG: 10
IOP_METHOD: ICARE
OS_IOP_MMHG: 8

## 2025-07-16 ASSESSMENT — EXTERNAL EXAM - LEFT EYE: OS_EXAM: NORMAL

## 2025-07-16 ASSESSMENT — EXTERNAL EXAM - RIGHT EYE: OD_EXAM: NORMAL

## 2025-07-16 ASSESSMENT — CUP TO DISC RATIO: OS_RATIO: 0.2

## 2025-07-16 NOTE — NURSING NOTE
"Chief Complaints and History of Present Illnesses   Patient presents with    Post Op (Ophthalmology) Left Eye     1 day s/p Complex CE/IOL with trypan blue left eye 7/15/25.     Patient notes she slept well last night. Patient notes some eye pain on the outer and inner parts of left eye. \"When I used the medicine, it was ok.\" Patient notes, \"everything is light, before it was dark\". Patient states vision is very blurry left eye. Patient states she started the drops 4 times a day.     Laine Del Angel, JE 11:30 AM 07/16/2025       Chief Complaint(s) and History of Present Illness(es)       Post Op (Ophthalmology) Left Eye              Laterality: left eye    Onset: days ago    Treatments tried: eye drops    Comments: 1 day s/p Complex CE/IOL with trypan blue left eye 7/15/25.     Patient notes she slept well last night. Patient notes some eye pain on the outer and inner parts of left eye. \"When I used the medicine, it was ok.\" Patient notes, \"everything is light, before it was dark\". Patient states vision is very blurry left eye. Patient states she started the drops 4 times a day.     Laine Del Angel, JE 11:30 AM 07/16/2025                      "
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
## 2025-07-16 NOTE — PATIENT INSTRUCTIONS
"  Predforte  (pink top) four times a day  (shake the bottle before)  Ofloxacin (tan top) four times a day    Put the eyedrops 5 minutes a part    Eye shield or glasses at all times x 3 weeks  Sleep with the shield  No heavy lifting     Retina detachment and endophthalmitis precautions were discussed with the patient (increased blurry vision, drainage, new flashes, floaters or a curtain in the visual field) and was asked to return if any of the those occur    What to watch out for:  If you experience any of the following \"RSVP Symptoms\", you should call immediately:  Worsening Redness  Worsening Sensitivity to light  Worsening Vision, including new flashing lights or floaters  Worsening Pain, including nausea/vomiting    Follow-up in one week  "

## 2025-07-23 ENCOUNTER — TELEPHONE (OUTPATIENT)
Dept: OPHTHALMOLOGY | Facility: CLINIC | Age: 45
End: 2025-07-23
Payer: COMMERCIAL
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
## 2025-07-24 ENCOUNTER — OFFICE VISIT (OUTPATIENT)
Dept: OPHTHALMOLOGY | Facility: CLINIC | Age: 45
End: 2025-07-24
Attending: OPHTHALMOLOGY
Payer: COMMERCIAL

## 2025-07-24 DIAGNOSIS — Z98.890 POSTOPERATIVE EYE STATE: Primary | ICD-10-CM

## 2025-07-24 DIAGNOSIS — Z98.890 POSTOPERATIVE EYE STATE: ICD-10-CM

## 2025-07-24 PROCEDURE — 92134 CPTRZ OPH DX IMG PST SGM RTA: CPT | Performed by: OPHTHALMOLOGY

## 2025-07-24 PROCEDURE — 92250 FUNDUS PHOTOGRAPHY W/I&R: CPT | Performed by: OPHTHALMOLOGY

## 2025-07-24 PROCEDURE — 92015 DETERMINE REFRACTIVE STATE: CPT

## 2025-07-24 PROCEDURE — G0463 HOSPITAL OUTPT CLINIC VISIT: HCPCS | Performed by: OPHTHALMOLOGY

## 2025-07-24 RX ORDER — PREDNISOLONE ACETATE 10 MG/ML
1 SUSPENSION/ DROPS OPHTHALMIC 4 TIMES DAILY
Qty: 5 ML | Refills: 0 | Status: SHIPPED | OUTPATIENT
Start: 2025-07-24

## 2025-07-24 ASSESSMENT — EXTERNAL EXAM - LEFT EYE: OS_EXAM: NORMAL

## 2025-07-24 ASSESSMENT — VISUAL ACUITY
OD_SC+: -2
OD_SC: 20/20
OS_SC: 3'/200 E
METHOD: SNELLEN - LINEAR

## 2025-07-24 ASSESSMENT — REFRACTION_MANIFEST
OD_ADD: +1.25
OD_SPHERE: -0.75
OS_CYLINDER: +0.50
OD_CYLINDER: +0.50
OD_AXIS: 049
OS_ADD: +1.25
OS_SPHERE: -0.75
OS_AXIS: 049

## 2025-07-24 ASSESSMENT — CUP TO DISC RATIO
OS_RATIO: 0.2
OD_RATIO: 0.3

## 2025-07-24 ASSESSMENT — TONOMETRY
OS_IOP_MMHG: 09
IOP_METHOD: ICARE
OD_IOP_MMHG: 15

## 2025-07-24 ASSESSMENT — EXTERNAL EXAM - RIGHT EYE: OD_EXAM: NORMAL

## 2025-07-24 ASSESSMENT — SLIT LAMP EXAM - LIDS
COMMENTS: NORMAL
COMMENTS: NORMAL

## 2025-07-24 NOTE — PATIENT INSTRUCTIONS
"  Predforte  (pink top) Three times a day x 1 week, then twice a day x 1 week and then once a day till finish   (shake the bottle before)  Ofloxacin (tan top) ok to stop   Eye shield or glasses at all times x 2 weeks  Sleep with the shield  Retina detachment and endophthalmitis precautions were discussed with the patient (increased blurry vision, drainage, new flashes, floaters or a curtain in the visual field) and was asked to return if any of the those occur    What to watch out for:  If you experience any of the following \"RSVP Symptoms\", you should call immediately:  Worsening Redness  Worsening Sensitivity to light  Worsening Vision, including new flashing lights or floaters  Worsening Pain, including nausea/vomiting    Follow-up in 3 weeks with dilation and prescription   "
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
## 2025-07-24 NOTE — NURSING NOTE
Chief Complaints and History of Present Illnesses   Patient presents with    Post Op (Ophthalmology) Left Eye     1week s/p Complex CE/IOL with trypan blue left eye 7/15/25.      Chief Complaint(s) and History of Present Illness(es)       Post Op (Ophthalmology) Left Eye              Laterality: left eye    Course: stable    Associated symptoms: Negative for eye pain, photophobia, flashes and floaters    Treatments tried: eye drops    Pain scale: 0/10    Comments: 1week s/p Complex CE/IOL with trypan blue left eye 7/15/25.               Comments    Compliant with gtt     Current ocular gtt  Predforte four times a day  Ofloxacin four times a day     Mihaela Barnard COA 10:20 AM July 24, 2025

## 2025-08-06 DIAGNOSIS — H35.9 MACULOPATHY: Primary | ICD-10-CM

## 2025-08-13 ENCOUNTER — OFFICE VISIT (OUTPATIENT)
Dept: OPHTHALMOLOGY | Facility: CLINIC | Age: 45
End: 2025-08-13
Attending: OPHTHALMOLOGY
Payer: COMMERCIAL

## 2025-08-13 DIAGNOSIS — H35.9 MACULOPATHY: ICD-10-CM

## 2025-08-13 PROCEDURE — 92250 FUNDUS PHOTOGRAPHY W/I&R: CPT | Performed by: OPHTHALMOLOGY

## 2025-08-13 PROCEDURE — 92134 CPTRZ OPH DX IMG PST SGM RTA: CPT | Performed by: OPHTHALMOLOGY

## 2025-08-13 PROCEDURE — 92015 DETERMINE REFRACTIVE STATE: CPT

## 2025-08-13 PROCEDURE — 99207 OCT RETINA SPECTRALIS OU (BOTH EYE): CPT | Mod: 26 | Performed by: OPHTHALMOLOGY

## 2025-08-13 PROCEDURE — 99024 POSTOP FOLLOW-UP VISIT: CPT | Mod: GC | Performed by: OPHTHALMOLOGY

## 2025-08-13 PROCEDURE — G0463 HOSPITAL OUTPT CLINIC VISIT: HCPCS | Performed by: OPHTHALMOLOGY

## 2025-08-13 PROCEDURE — 92250 FUNDUS PHOTOGRAPHY W/I&R: CPT | Mod: 26 | Performed by: OPHTHALMOLOGY

## 2025-08-13 ASSESSMENT — REFRACTION_MANIFEST
OS_AXIS: 055
OD_CYLINDER: +0.25
OD_ADD: +1.25
OS_CYLINDER: +0.50
OS_SPHERE: -0.50
OD_SPHERE: -0.50
OS_ADD: +1.25
OD_AXIS: 050

## 2025-08-13 ASSESSMENT — CUP TO DISC RATIO
OS_RATIO: 0.2
OD_RATIO: 0.4

## 2025-08-13 ASSESSMENT — EXTERNAL EXAM - LEFT EYE: OS_EXAM: NORMAL

## 2025-08-13 ASSESSMENT — TONOMETRY
OD_IOP_MMHG: 10
IOP_METHOD: ICARE
OS_IOP_MMHG: 10

## 2025-08-13 ASSESSMENT — VISUAL ACUITY
OD_SC: 20/20
OS_SC: 3'/200 E CARD
METHOD: SNELLEN - LINEAR

## 2025-08-13 ASSESSMENT — SLIT LAMP EXAM - LIDS
COMMENTS: NORMAL
COMMENTS: NORMAL

## 2025-08-13 ASSESSMENT — EXTERNAL EXAM - RIGHT EYE: OD_EXAM: NORMAL

## (undated) DEVICE — EYE SHIELD PLASTIC

## (undated) DEVICE — SU ENDO POLYSORB 0 3" LOOP 21" EL-21-L

## (undated) DEVICE — TAPE MICROPORE 1"X1.5YD 1530S-1

## (undated) DEVICE — ENDO TROCAR SLEEVE KII Z-THREADED 05X100MM CTS02

## (undated) DEVICE — SU VICRYL 0 UR-6 27" J603H

## (undated) DEVICE — TAPE MICROPORE 2"X1.5YD 1530S-2

## (undated) DEVICE — LINEN TOWEL PACK X6 WHITE 5487

## (undated) DEVICE — CLIP APPLIER ENDO 5MM M/L LIGAMAX EL5ML

## (undated) DEVICE — SU DERMABOND ADVANCED .7ML DNX12

## (undated) DEVICE — EYE PACK CUSTOM ANTERIOR 30DEG TIP CENTURION PPK6682-04

## (undated) DEVICE — SYR 30ML LL W/O NDL 302832

## (undated) DEVICE — ANTIFOG SOLUTION W/FOAM PAD 31142527

## (undated) DEVICE — CATARACT BLADE PACK 2.5MM 58001898

## (undated) DEVICE — LINEN GOWN XLG 5407

## (undated) DEVICE — NDL INSUFFLATION 13GA 120MM C2201

## (undated) DEVICE — PACK CATARACT CUSTOM ASC SEY15CPUMC

## (undated) DEVICE — EYE TIP IRRIGATION & ASPIRATION POLYMER 35D BENT 8065751511

## (undated) DEVICE — SUCTION MANIFOLD DORNOCH ULTRA CART UL-CL500

## (undated) DEVICE — SU VICRYL 0 CT-2 27" J334H

## (undated) DEVICE — SOL WATER IRRIG 500ML BOTTLE 2F7113

## (undated) DEVICE — ENDO POUCH UNIV RETRIEVAL SYSTEM INZII 10MM CD001

## (undated) DEVICE — GLOVE PROTEXIS POWDER FREE SMT 7.5  2D72PT75X

## (undated) DEVICE — LINEN TOWEL PACK X5 5464

## (undated) DEVICE — PREP CHLORAPREP 26ML TINTED ORANGE  260815

## (undated) DEVICE — SOL NACL 0.9% INJ 1000ML BAG 2B1324X

## (undated) DEVICE — SU MONOCRYL 4-0 PS-2 18" UND Y496G

## (undated) DEVICE — GLOVE PROTEXIS MICRO 6.0 LT BLUE 2D73PM60

## (undated) DEVICE — SOL WATER IRRIG 1000ML BOTTLE 2F7114

## (undated) DEVICE — ENDO TROCAR FIRST ENTRY KII FIOS Z-THRD 05X100MM CTF03

## (undated) DEVICE — EYE NDL RETROBULBAR ATKINSON 25GA 1.5" 581637

## (undated) DEVICE — EYE CANN IRR 25GA HYDRODISSECTING 585037

## (undated) DEVICE — SUCTION IRR STRYKERFLOW II W/TIP 250-070-520

## (undated) DEVICE — ENDO TROCAR BLUNT TIP KII BALLOON 12X100MM C0R47

## (undated) DEVICE — Device

## (undated) RX ORDER — ONDANSETRON 2 MG/ML
INJECTION INTRAMUSCULAR; INTRAVENOUS
Status: DISPENSED
Start: 2025-07-15

## (undated) RX ORDER — HYDROMORPHONE HYDROCHLORIDE 1 MG/ML
INJECTION, SOLUTION INTRAMUSCULAR; INTRAVENOUS; SUBCUTANEOUS
Status: DISPENSED
Start: 2018-11-17

## (undated) RX ORDER — DEXAMETHASONE SODIUM PHOSPHATE 4 MG/ML
INJECTION, SOLUTION INTRA-ARTICULAR; INTRALESIONAL; INTRAMUSCULAR; INTRAVENOUS; SOFT TISSUE
Status: DISPENSED
Start: 2025-07-15

## (undated) RX ORDER — FENTANYL CITRATE 50 UG/ML
INJECTION, SOLUTION INTRAMUSCULAR; INTRAVENOUS
Status: DISPENSED
Start: 2018-11-17

## (undated) RX ORDER — EPHEDRINE SULFATE 50 MG/ML
INJECTION, SOLUTION INTRAMUSCULAR; INTRAVENOUS; SUBCUTANEOUS
Status: DISPENSED
Start: 2018-11-17

## (undated) RX ORDER — DEXAMETHASONE SODIUM PHOSPHATE 4 MG/ML
INJECTION, SOLUTION INTRA-ARTICULAR; INTRALESIONAL; INTRAMUSCULAR; INTRAVENOUS; SOFT TISSUE
Status: DISPENSED
Start: 2018-11-17

## (undated) RX ORDER — EPHEDRINE SULFATE 50 MG/ML
INJECTION, SOLUTION INTRAMUSCULAR; INTRAVENOUS; SUBCUTANEOUS
Status: DISPENSED
Start: 2025-07-15

## (undated) RX ORDER — BUPIVACAINE HYDROCHLORIDE 5 MG/ML
INJECTION, SOLUTION EPIDURAL; INTRACAUDAL
Status: DISPENSED
Start: 2018-11-17

## (undated) RX ORDER — FENTANYL CITRATE 50 UG/ML
INJECTION, SOLUTION INTRAMUSCULAR; INTRAVENOUS
Status: DISPENSED
Start: 2025-07-15

## (undated) RX ORDER — PROPOFOL 10 MG/ML
INJECTION, EMULSION INTRAVENOUS
Status: DISPENSED
Start: 2025-07-15

## (undated) RX ORDER — ESMOLOL HYDROCHLORIDE 10 MG/ML
INJECTION INTRAVENOUS
Status: DISPENSED
Start: 2018-11-17

## (undated) RX ORDER — PHENYLEPHRINE HCL IN 0.9% NACL 1 MG/10 ML
SYRINGE (ML) INTRAVENOUS
Status: DISPENSED
Start: 2018-11-17

## (undated) RX ORDER — PROPOFOL 10 MG/ML
INJECTION, EMULSION INTRAVENOUS
Status: DISPENSED
Start: 2018-11-17

## (undated) RX ORDER — ONDANSETRON 2 MG/ML
INJECTION INTRAMUSCULAR; INTRAVENOUS
Status: DISPENSED
Start: 2018-11-17

## (undated) RX ORDER — CEFAZOLIN SODIUM 1 G/3ML
INJECTION, POWDER, FOR SOLUTION INTRAMUSCULAR; INTRAVENOUS
Status: DISPENSED
Start: 2018-11-17

## (undated) RX ORDER — OXYCODONE HYDROCHLORIDE 5 MG/1
TABLET ORAL
Status: DISPENSED
Start: 2025-07-15

## (undated) RX ORDER — ACETAMINOPHEN 325 MG/1
TABLET ORAL
Status: DISPENSED
Start: 2025-07-15

## (undated) RX ORDER — BUPIVACAINE HYDROCHLORIDE 2.5 MG/ML
INJECTION, SOLUTION EPIDURAL; INFILTRATION; INTRACAUDAL
Status: DISPENSED
Start: 2018-11-17